# Patient Record
Sex: MALE | Race: WHITE | NOT HISPANIC OR LATINO | Employment: OTHER | URBAN - METROPOLITAN AREA
[De-identification: names, ages, dates, MRNs, and addresses within clinical notes are randomized per-mention and may not be internally consistent; named-entity substitution may affect disease eponyms.]

---

## 2017-06-28 ENCOUNTER — HOSPITAL ENCOUNTER (OUTPATIENT)
Dept: RADIOLOGY | Facility: MEDICAL CENTER | Age: 72
End: 2017-06-28

## 2017-06-28 ENCOUNTER — RESOLUTE PROFESSIONAL BILLING HOSPITAL PROF FEE (OUTPATIENT)
Dept: HOSPITALIST | Facility: MEDICAL CENTER | Age: 72
End: 2017-06-28
Payer: COMMERCIAL

## 2017-06-28 ENCOUNTER — HOSPITAL ENCOUNTER (INPATIENT)
Facility: MEDICAL CENTER | Age: 72
LOS: 2 days | DRG: 247 | End: 2017-06-30
Attending: EMERGENCY MEDICINE | Admitting: HOSPITALIST
Payer: COMMERCIAL

## 2017-06-28 DIAGNOSIS — R07.9 CHEST PAIN, UNSPECIFIED TYPE: ICD-10-CM

## 2017-06-28 DIAGNOSIS — I25.118 CORONARY ARTERY DISEASE OF NATIVE ARTERY OF NATIVE HEART WITH STABLE ANGINA PECTORIS (HCC): ICD-10-CM

## 2017-06-28 DIAGNOSIS — R79.89 ELEVATED TROPONIN: ICD-10-CM

## 2017-06-28 DIAGNOSIS — I21.4 NSTEMI (NON-ST ELEVATED MYOCARDIAL INFARCTION) (HCC): ICD-10-CM

## 2017-06-28 PROBLEM — I25.10 CAD (CORONARY ARTERY DISEASE): Status: ACTIVE | Noted: 2017-06-28

## 2017-06-28 LAB
ANION GAP SERPL CALC-SCNC: 9 MMOL/L (ref 0–11.9)
APTT PPP: 121.5 SEC (ref 24.7–36)
BUN SERPL-MCNC: 17 MG/DL (ref 8–22)
CALCIUM SERPL-MCNC: 9 MG/DL (ref 8.5–10.5)
CHLORIDE SERPL-SCNC: 109 MMOL/L (ref 96–112)
CO2 SERPL-SCNC: 21 MMOL/L (ref 20–33)
CREAT SERPL-MCNC: 1.15 MG/DL (ref 0.5–1.4)
EKG IMPRESSION: NORMAL
GFR SERPL CREATININE-BSD FRML MDRD: >60 ML/MIN/1.73 M 2
GLUCOSE BLD-MCNC: 198 MG/DL (ref 65–99)
GLUCOSE SERPL-MCNC: 71 MG/DL (ref 65–99)
MAGNESIUM SERPL-MCNC: 1.4 MG/DL (ref 1.5–2.5)
POTASSIUM SERPL-SCNC: 4 MMOL/L (ref 3.6–5.5)
SODIUM SERPL-SCNC: 139 MMOL/L (ref 135–145)
TROPONIN I SERPL-MCNC: 2.62 NG/ML (ref 0–0.04)
TROPONIN I SERPL-MCNC: 3.11 NG/ML (ref 0–0.04)

## 2017-06-28 PROCEDURE — 93005 ELECTROCARDIOGRAM TRACING: CPT

## 2017-06-28 PROCEDURE — 96375 TX/PRO/DX INJ NEW DRUG ADDON: CPT

## 2017-06-28 PROCEDURE — 83735 ASSAY OF MAGNESIUM: CPT

## 2017-06-28 PROCEDURE — 770020 HCHG ROOM/CARE - TELE (206)

## 2017-06-28 PROCEDURE — 85730 THROMBOPLASTIN TIME PARTIAL: CPT

## 2017-06-28 PROCEDURE — 96365 THER/PROPH/DIAG IV INF INIT: CPT

## 2017-06-28 PROCEDURE — 99223 1ST HOSP IP/OBS HIGH 75: CPT | Mod: AI | Performed by: HOSPITALIST

## 2017-06-28 PROCEDURE — 82962 GLUCOSE BLOOD TEST: CPT

## 2017-06-28 PROCEDURE — 700102 HCHG RX REV CODE 250 W/ 637 OVERRIDE(OP): Performed by: INTERNAL MEDICINE

## 2017-06-28 PROCEDURE — 99285 EMERGENCY DEPT VISIT HI MDM: CPT

## 2017-06-28 PROCEDURE — 36415 COLL VENOUS BLD VENIPUNCTURE: CPT

## 2017-06-28 PROCEDURE — 80048 BASIC METABOLIC PNL TOTAL CA: CPT

## 2017-06-28 PROCEDURE — A9270 NON-COVERED ITEM OR SERVICE: HCPCS | Performed by: INTERNAL MEDICINE

## 2017-06-28 PROCEDURE — 96366 THER/PROPH/DIAG IV INF ADDON: CPT

## 2017-06-28 PROCEDURE — 700111 HCHG RX REV CODE 636 W/ 250 OVERRIDE (IP): Performed by: PHARMACIST

## 2017-06-28 PROCEDURE — 93005 ELECTROCARDIOGRAM TRACING: CPT | Performed by: HOSPITALIST

## 2017-06-28 PROCEDURE — 304561 HCHG STAT O2

## 2017-06-28 PROCEDURE — 93010 ELECTROCARDIOGRAM REPORT: CPT | Performed by: INTERNAL MEDICINE

## 2017-06-28 PROCEDURE — 84484 ASSAY OF TROPONIN QUANT: CPT | Mod: 91

## 2017-06-28 RX ORDER — SODIUM CHLORIDE 9 MG/ML
INJECTION, SOLUTION INTRAVENOUS CONTINUOUS
Status: DISCONTINUED | OUTPATIENT
Start: 2017-06-28 | End: 2017-06-28

## 2017-06-28 RX ORDER — ZOLPIDEM TARTRATE 5 MG/1
5 TABLET ORAL
Status: DISCONTINUED | OUTPATIENT
Start: 2017-06-28 | End: 2017-06-30 | Stop reason: HOSPADM

## 2017-06-28 RX ORDER — ASPIRIN 81 MG/1
81 TABLET, CHEWABLE ORAL DAILY
COMMUNITY

## 2017-06-28 RX ORDER — SODIUM CHLORIDE 9 MG/ML
INJECTION, SOLUTION INTRAVENOUS CONTINUOUS
Status: DISCONTINUED | OUTPATIENT
Start: 2017-06-29 | End: 2017-06-29

## 2017-06-28 RX ORDER — HEPARIN SODIUM 1000 [USP'U]/ML
3200 INJECTION, SOLUTION INTRAVENOUS; SUBCUTANEOUS PRN
Status: DISCONTINUED | OUTPATIENT
Start: 2017-06-28 | End: 2017-06-29

## 2017-06-28 RX ORDER — BENAZEPRIL HYDROCHLORIDE 40 MG/1
40 TABLET ORAL DAILY
Status: ON HOLD | COMMUNITY
End: 2017-06-30

## 2017-06-28 RX ORDER — ASPIRIN 81 MG/1
81 TABLET, CHEWABLE ORAL
Status: DISCONTINUED | OUTPATIENT
Start: 2017-06-28 | End: 2017-06-29

## 2017-06-28 RX ORDER — PRASUGREL 10 MG/1
10 TABLET, FILM COATED ORAL DAILY
Status: DISCONTINUED | OUTPATIENT
Start: 2017-06-29 | End: 2017-06-29

## 2017-06-28 RX ORDER — GLIMEPIRIDE 2 MG/1
2 TABLET ORAL DAILY
Status: DISCONTINUED | OUTPATIENT
Start: 2017-06-29 | End: 2017-06-28

## 2017-06-28 RX ORDER — SIMVASTATIN 20 MG
20 TABLET ORAL DAILY
COMMUNITY
End: 2017-06-28

## 2017-06-28 RX ORDER — MORPHINE SULFATE 4 MG/ML
2-4 INJECTION, SOLUTION INTRAMUSCULAR; INTRAVENOUS
Status: DISCONTINUED | OUTPATIENT
Start: 2017-06-28 | End: 2017-06-30 | Stop reason: HOSPADM

## 2017-06-28 RX ORDER — MAGNESIUM SULFATE HEPTAHYDRATE 40 MG/ML
2 INJECTION, SOLUTION INTRAVENOUS ONCE
Status: COMPLETED | OUTPATIENT
Start: 2017-06-28 | End: 2017-06-29

## 2017-06-28 RX ORDER — SIMVASTATIN 40 MG
40 TABLET ORAL NIGHTLY
Status: DISCONTINUED | OUTPATIENT
Start: 2017-06-28 | End: 2017-06-30 | Stop reason: HOSPADM

## 2017-06-28 RX ORDER — HEPARIN SODIUM 1000 [USP'U]/ML
6000 INJECTION, SOLUTION INTRAVENOUS; SUBCUTANEOUS ONCE
Status: COMPLETED | OUTPATIENT
Start: 2017-06-28 | End: 2017-06-28

## 2017-06-28 RX ORDER — GLIMEPIRIDE 2 MG/1
2 TABLET ORAL DAILY
COMMUNITY

## 2017-06-28 RX ORDER — PRASUGREL 10 MG/1
10 TABLET, FILM COATED ORAL DAILY
COMMUNITY

## 2017-06-28 RX ORDER — ONDANSETRON 4 MG/1
4 TABLET, ORALLY DISINTEGRATING ORAL EVERY 4 HOURS PRN
Status: DISCONTINUED | OUTPATIENT
Start: 2017-06-28 | End: 2017-06-30 | Stop reason: HOSPADM

## 2017-06-28 RX ORDER — TRAZODONE HYDROCHLORIDE 50 MG/1
50 TABLET ORAL NIGHTLY
Status: DISCONTINUED | OUTPATIENT
Start: 2017-06-28 | End: 2017-06-30 | Stop reason: HOSPADM

## 2017-06-28 RX ORDER — DEXTROSE MONOHYDRATE 25 G/50ML
25 INJECTION, SOLUTION INTRAVENOUS
Status: DISCONTINUED | OUTPATIENT
Start: 2017-06-28 | End: 2017-06-30 | Stop reason: HOSPADM

## 2017-06-28 RX ORDER — ONDANSETRON 2 MG/ML
4 INJECTION INTRAMUSCULAR; INTRAVENOUS EVERY 4 HOURS PRN
Status: DISCONTINUED | OUTPATIENT
Start: 2017-06-28 | End: 2017-06-30 | Stop reason: HOSPADM

## 2017-06-28 RX ORDER — NITROGLYCERIN 0.4 MG/1
0.4 TABLET SUBLINGUAL
Status: DISCONTINUED | OUTPATIENT
Start: 2017-06-28 | End: 2017-06-30 | Stop reason: HOSPADM

## 2017-06-28 RX ORDER — SIMVASTATIN 40 MG
40 TABLET ORAL NIGHTLY
COMMUNITY

## 2017-06-28 RX ORDER — TRAZODONE HYDROCHLORIDE 50 MG/1
50 TABLET ORAL NIGHTLY
COMMUNITY

## 2017-06-28 RX ORDER — BENAZEPRIL HYDROCHLORIDE 20 MG/1
20 TABLET ORAL DAILY
Status: DISCONTINUED | OUTPATIENT
Start: 2017-06-29 | End: 2017-06-30 | Stop reason: HOSPADM

## 2017-06-28 RX ADMIN — METOPROLOL TARTRATE 25 MG: 25 TABLET, FILM COATED ORAL at 22:05

## 2017-06-28 RX ADMIN — NITROGLYCERIN 0.5 INCH: 20 OINTMENT TOPICAL at 17:44

## 2017-06-28 RX ADMIN — ASPIRIN 81 MG: 81 TABLET, CHEWABLE ORAL at 22:05

## 2017-06-28 RX ADMIN — HEPARIN SODIUM 6000 UNITS: 1000 INJECTION, SOLUTION INTRAVENOUS; SUBCUTANEOUS at 17:47

## 2017-06-28 RX ADMIN — TRAZODONE HYDROCHLORIDE 50 MG: 50 TABLET ORAL at 22:05

## 2017-06-28 RX ADMIN — HEPARIN SODIUM 1200 UNITS: 5000 INJECTION, SOLUTION INTRAVENOUS at 17:47

## 2017-06-28 RX ADMIN — SIMVASTATIN 40 MG: 40 TABLET, FILM COATED ORAL at 22:05

## 2017-06-28 ASSESSMENT — ENCOUNTER SYMPTOMS
NAUSEA: 0
VOMITING: 0
BRUISES/BLEEDS EASILY: 0
INSOMNIA: 1
NEUROLOGICAL NEGATIVE: 1
SHORTNESS OF BREATH: 0
WEAKNESS: 0
PALPITATIONS: 0
CONSTITUTIONAL NEGATIVE: 1
DEPRESSION: 0

## 2017-06-28 ASSESSMENT — LIFESTYLE VARIABLES
HAVE YOU EVER FELT YOU SHOULD CUT DOWN ON YOUR DRINKING: NO
EVER FELT BAD OR GUILTY ABOUT YOUR DRINKING: NO
TOTAL SCORE: 0
AVERAGE NUMBER OF DAYS PER WEEK YOU HAVE A DRINK CONTAINING ALCOHOL: 2
HAVE PEOPLE ANNOYED YOU BY CRITICIZING YOUR DRINKING: NO
DO YOU DRINK ALCOHOL: YES
ON A TYPICAL DAY WHEN YOU DRINK ALCOHOL HOW MANY DRINKS DO YOU HAVE: 2
TOTAL SCORE: 0
CONSUMPTION TOTAL: NEGATIVE
EVER HAD A DRINK FIRST THING IN THE MORNING TO STEADY YOUR NERVES TO GET RID OF A HANGOVER: NO
HOW MANY TIMES IN THE PAST YEAR HAVE YOU HAD 5 OR MORE DRINKS IN A DAY: 0
TOTAL SCORE: 0

## 2017-06-28 ASSESSMENT — COGNITIVE AND FUNCTIONAL STATUS - GENERAL
DAILY ACTIVITIY SCORE: 24
SUGGESTED CMS G CODE MODIFIER DAILY ACTIVITY: CH
SUGGESTED CMS G CODE MODIFIER MOBILITY: CH
MOBILITY SCORE: 24

## 2017-06-28 NOTE — IP AVS SNAPSHOT
" <p align=\"LEFT\"><IMG SRC=\"//[email or web address]\" alt=\"Image\" WIDTH=\"50%\" HEIGHT=\"200\" BORDER=\"\"></p>                   Name:Parvez Scott  Medical Record Number:[id number]  CSN: [id number]    YOB: 1945   Age: 71 y.o.  Sex: male  HT:1.778 m (5' 10\") WT: 93 kg (205 lb 0.4 oz)          Admit Date: 6/28/2017     Discharge Date:   Today's Date: 6/30/2017  Attending Doctor:  Carlos A Mijares D.O.                  Allergies:  Review of patient's allergies indicates no known allergies.          Follow-up Information     1. Follow up with PCP . Schedule an appointment as soon as possible for a visit in 1 week.         Medication List      Take these Medications        Instructions    aspirin 81 MG Chew chewable tablet   Commonly known as:  ASA    Take 81 mg by mouth every day.   Dose:  81 mg       benazepril 20 MG Tabs   What changed:    - medication strength  - how much to take   Commonly known as:  LOTENSIN    Take 1 Tab by mouth every day.   Dose:  20 mg       carvedilol 3.125 MG Tabs   Commonly known as:  COREG    Take 1 Tab by mouth 2 times a day, with meals.   Dose:  3.125 mg       glimepiride 2 MG Tabs   Commonly known as:  AMARYL    Take 2 mg by mouth every day.   Dose:  2 mg       metformin 500 MG Tabs   Commonly known as:  GLUCOPHAGE    Take 500 mg by mouth 2 times a day, with meals.   Dose:  500 mg       prasugrel 10 MG Tabs   Commonly known as:  EFFIENT    Take 10 mg by mouth every day.   Dose:  10 mg       simvastatin 40 MG Tabs   Commonly known as:  ZOCOR    Take 40 mg by mouth every evening.   Dose:  40 mg       trazodone 50 MG Tabs   Commonly known as:  DESYREL    Take 50 mg by mouth every evening.   Dose:  50 mg         "

## 2017-06-28 NOTE — ED NOTES
Jose from Lab called with critical result of troponin 3.11 at 1655. Critical lab result read back to Jose.   Dr. Degroot notified of critical lab result at 1656.  Critical lab result read back by Dr. Degroot.

## 2017-06-28 NOTE — IP AVS SNAPSHOT
6/30/2017    Parvez Scott  [address]  [facility] [address]    Dear Parvez:    [facility] wants to ensure your discharge home is safe and you or your loved ones have had all of your questions answered regarding your care after you leave the hospital.    Below is a list of resources and contact information should you have any questions regarding your hospital stay, follow-up instructions, or active medical symptoms.    Questions or Concerns Regarding… Contact   Medical Questions Related to Your Discharge  (7 days a week, 8am-5pm) Contact a Nurse Care Coordinator   [phone]   Medical Questions Not Related to Your Discharge  (24 hours a day / 7 days a week)  Contact the Nurse Health Line   [phone]    Medications or Discharge Instructions Refer to your discharge packet   or contact your [facility] Primary Care Provider   [phone]   Follow-up Appointment(s) Schedule your appointment via [facility]Owl   or contact Scheduling [phone]   Billing Review your statement via NightOwl  or contact Billing [phone]   Medical Records Review your records via NightOwl   or contact Medical Records [phone]     You may receive a telephone call within two days of discharge. This call is to make certain you understand your discharge instructions and have the opportunity to have any questions answered. You can also easily access your medical information, test results and upcoming appointments via the [facility]Owl free online health management tool. You can learn more and sign up at [employer]/NightOwl. For assistance setting up your NightOwl account, please call [phone].    Once again, we want to ensure your discharge home is safe and that you have a clear understanding of any next steps in your care. If you have any questions or concerns, please do not hesitate to contact us, we are here for you. Thank you for choosing [facility] for your healthcare needs.    Sincerely,    Your [facility] Healthcare Team

## 2017-06-28 NOTE — ED PROVIDER NOTES
"ED Provider Note    CHIEF COMPLAINT  Chief Complaint   Patient presents with   • Chest Pain     Transfer from [place], chest pain at 0830, resolved after 20 minutes       HPI  Parvez Scott is a 71 y.o. female who presents via transfer from [place] for elevated troponin in the setting of chest pain.    Patient states he was looking up the [place] trailer when he had acute onset of sharp substernal chest pain that became more severe and achy or as time went on. This was nonradiating. The pain lasted approximately 30-45 minutes. It resolved with rest and \"calming down.\" He believes perhaps to aspirin he took also helped him. He admits to associated shortness of breath but denies nausea, vomiting, diaphoresis.  He denies trauma, calf pain, leg swelling. Patient has had similar symptoms when he had an MI and was found to have a 97% occlusion of his LAD 2 years ago in [facility].    Patient denies chest pain at this time.      ALLERGIES  No Known Allergies    CURRENT MEDICATIONS  Home Medications     Reviewed by Feng Mckeon (Pharmacy Tech) on 06/28/17 at 1647  Med List Status: Complete    Medication Last Dose Status    aspirin (ASA) 81 MG Chew Tab chewable tablet 6/27/2017 Active    benazepril (LOTENSIN) 40 MG tablet 6/28/2017 Active    glimepiride (AMARYL) 2 MG Tab 6/28/2017 Active    metformin (GLUCOPHAGE) 500 MG Tab 6/28/2017 Active    prasugrel (EFFIENT) 10 MG Tab 6/27/2017 Active    simvastatin (ZOCOR) 40 MG Tab 6/27/2017 Active    trazodone (DESYREL) 50 MG Tab 6/27/2017 Active                PAST MEDICAL HISTORY     Coronary artery disease, diabetes, dyslipidemia    SURGICAL HISTORY  patient denies any surgical history    SOCIAL HISTORY  Lives in [facility]        REVIEW OF SYSTEMS  See HPI for further details.  All other systems are negative except as above in HPI.      PHYSICAL EXAM  VITAL SIGNS: /65 mmHg  Pulse 79  Resp 16  Ht 1.778 m (5' 10\")  Wt 97.52 kg (214 " lb 15.9 oz)  BMI 30.85 kg/m2  SpO2 97%    General:  WD overweight, nontoxic appearing in NAD; A+Ox3; V/S as above   Skin: warm and dry; good color; no rash  HEENT: NCAT; EOMs intact; PERRL; no scleral icterus   Neck: FROM; no meningismus, no LAD  Cardiovascular: Regular heart rate and rhythm.  No murmurs, rubs, or gallops; pulses 2+ bilaterally radially   Chest wall: Nontender, no crepitus  Lungs: Clear to auscultation with good air movement bilaterally.  No wheezes, rhonchi, or rales.   Abdomen: BS present; soft; NTND; no rebound, guarding, or rigidity.  No organomegaly or pulsatile mass; no CVAT   Extremities: CALLE x 4; no e/o trauma; no pedal edema; negative Homans  Neurologic: CNs III-XII grossly intact; speech clear; distal sensation intact; strength 5/5 UE/LEs  Psychiatric: Appropriate affect, normal mood    LABS  Results for orders placed or performed during the hospital encounter of 17   TROPONIN   Result Value Ref Range    Troponin I 3.11 (H) 0.00 - 0.04 ng/mL   EKG (ER)   Result Value Ref Range    Report       [facility] Emergency Dept.    Test Date:  2017  Pt Name:    CHANO PARISI              Department: ER  MRN:        [id number]                      Room:        13  Gender:     F                            Technician: [id number]  :        1945                   Requested By:ANAI WILLOUGHBY  Order #:    [id number]                    Reading MD: ANAI WILLOUGHBY MD    Measurements  Intervals                                Axis  Rate:       65                           P:          1  WY:         172                          QRS:        -48  QRSD:       90                           T:          -5  QT:         408  QTc:        425    Interpretive Statements  SINUS RHYTHM  LEFT ANTERIOR FASCICULAR BLOCK  LATE PRECORDIAL R/S TRANSITION  CONSIDER LEFT VENTRICULAR HYPERTROPHY  BORDERLINE T ABNORMALITIES, INFERIOR LEADS  No previous ECG available for  comparison    Electronically Signed On 6- 16:19:29 PDT by ANAI WILLOUGHBY MD           EKG  12 Lead EKG obtained at 1615 and interpreted by me to show:  Rhythm: Sinus rhythm   Rate: 65  Intervals:   MD: 172   QRS: 90   QTC: 425   All intervals are within normal limits  No ectopy  Leftward axis  No ST changes  Clinical Impression: sinus rhythm with no acute ST changes  Compared to previous EKG dated earlier today which shows no change  EKG has been confirmed in [facility]    IMAGING  OUTSIDE IMAGES-DX CHEST   Preliminary Result            MEDICAL RECORD  I have reviewed patient's medical record and pertinent results are listed below.    Patient's outside records are reviewed. Labs reveal a glucose of 127, BUN 19, creatinine 1.3, troponin 0.436, and myoglobin of 182. Chest x-ray demonstrates no acute pulmonary or cardiac process.      COURSE & MEDICAL DECISION MAKING  I have reviewed any medical record information, laboratory studies and radiographic results as noted.    Parvez Scott is a 71 y.o. male with known coronary artery disease status post stent placement of LAD 2.5 years ago who presents as a transfer from [place] after he presented there for chest pain and found to have an elevated troponin. EKG revealed no ST elevations. Patient received aspirin prior to arrival. No chest pain at this time. I do not suspect thoracic aortic dissection or PE.    Initial EKG here demonstrates nonspecific T wave abnormalities inferiorly.    4:31 PM  Paging cardiology  I discussed the case with Dr. Campos from the hospitalist service who agrees to admit the patient is aware of the pending cardiology consult.      Lab called with a troponin of 3.11.    5:02 PM  I discussed the case with Dr. Dugan who will come see the patient now    The total critical care time on this patient is 40 minutes, resuscitating patient, speaking with admitting physician, and deciphering test results. This 40 minutes is exclusive of  separately billable procedures.      FINAL IMPRESSION  1. Chest pain, unspecified type    2. Elevated troponin      Electronically signed by: Shanita Degroot, 6/28/2017 4:12 PM

## 2017-06-28 NOTE — IP AVS SNAPSHOT
[facility] Access Code: [email or web address]  Expires: 7/30/2017 11:55 AM    Your email address is not on file at [facility].  Email Addresses are required for you to sign up for [facility], please contact [phone] to verify your personal information and to provide your email address prior to attempting to register for [facility].    Parvez Scott  [address] [place] [address]    [facility]  A secure, online tool to manage your health information     [facility]’s [facility]® is a secure, online tool that connects you to your personalized health information from the privacy of your home -- day or night - making it very easy for you to manage your healthcare. Once the activation process is completed, you can even access your medical information using the [facility] eduin, which is available for free in the Apple Eduin store or Google Play store.     To learn more about [facility], visit [email or web address]    There are two levels of access available (as shown below):   My Chart Features  [facility] Primary Care Doctor [facility]  Specialists [facility]  Urgent  Care Non-[facility] Primary Care Doctor   Email your healthcare team securely and privately 24/7 X X X    Manage appointments: schedule your next appointment; view details of past/upcoming appointments X      Request prescription refills. X      View recent personal medical records, including lab and immunizations X X X X   View health record, including health history, allergies, medications X X X X   Read reports about your outpatient visits, procedures, consult and ER notes X X X X   See your discharge summary, which is a recap of your hospital and/or ER visit that includes your diagnosis, lab results, and care plan X X  X     How to register for [facility]:  Once your e-mail address has been verified, follow the following steps to sign up for [facility].     1. Go to  [email or web address]  2. Click on the Sign Up Now box, which takes you to the New Member Sign Up page. You will  need to provide the following information:  a. Enter your [facility] Access Code exactly as it appears at the top of this page. (You will not need to use this code after you’ve completed the sign-up process. If you do not sign up before the expiration date, you must request a new code.)   b. Enter your date of birth.   c. Enter your home email address.   d. Click Submit, and follow the next screen’s instructions.  3. Create a [employer] ID. This will be your [employer] login ID and cannot be changed, so think of one that is secure and easy to remember.  4. Create a [employer] password. You can change your password at any time.  5. Enter your Password Reset Question and Answer. This can be used at a later time if you forget your password.   6. Enter your e-mail address. This allows you to receive e-mail notifications when new information is available in [employer].  7. Click Sign Up. You can now view your health information.    For assistance activating your [employer] account, call [phone]

## 2017-06-28 NOTE — ED NOTES
Pt transferred from [place].  Pt developed chest pain after hooking up a trailer, sharp in nature.  Pt states the pain lasted about 20 minutes and subsided.  Pt is currently asymptomatic.

## 2017-06-28 NOTE — IP AVS SNAPSHOT
" Home Care Instructions                                                                                                                  Name:Parvez Scott  Medical Record Number:[id number]  CSN: [id number]    YOB: 1945   Age: 71 y.o.  Sex: male  HT:1.778 m (5' 10\") WT: 93 kg (205 lb 0.4 oz)          Admit Date: 6/28/2017     Discharge Date:   Today's Date: 6/30/2017  Attending Doctor:  Carlos A Mijares D.O.                  Allergies:  Review of patient's allergies indicates no known allergies.            Discharge Instructions       Discharge Instructions    Discharged to home by car with relative. Discharged via walking, hospital escort: Refused.  Special equipment needed: Not Applicable    Be sure to schedule a follow-up appointment with your primary care doctor or any specialists as instructed.     Discharge Plan:   Diet Plan: Discussed  Activity Level: Discussed  Confirmed Follow up Appointment: Patient to Call and Schedule Appointment  Confirmed Symptoms Management: Discussed  Medication Reconciliation Updated: Yes  Influenza Vaccine Indication: Patient Refuses    I understand that a diet low in cholesterol, fat, and sodium is recommended for good health. Unless I have been given specific instructions below for another diet, I accept this instruction as my diet prescription.   Other diet: Cardiac, Diabetic     Special Instructions: Diagnosis:  Acute Coronary Syndrome (ACS) is a diagnosis that encompasses cardiac-related chest pain and heart attack. ACS occurs when the blood flow to the heart muscle is severely reduced or cut off completely due to a slow process called atherosclerosis.  Atherosclerosis is a disease in which the coronary arteries become narrow from a buildup of fat, cholesterol, and other substances that combine to form plaque. If the plaque breaks, a blood clot will form and block the blood flow to the heart muscle. This lack of blood flow can cause damage or death to the heart " muscle which is called a heart attack or Myocardial Infarction (MI). There are two different types of MIs:  ST Elevation Myocardial Infarction or STEMI (the most severe type of heart attack) and Non-ST Elevation Myocardial Infarction or NSTEMI.    Treatment Plan:  · Cardiac Diet  - Low fat, low salt, low cholesterol   · Cardiac Rehab  - Your doctor has ordered you a referral to [facility].  Call [phone] to schedule an appointment.  · Attend my follow-up appointment with my Cardiologist.  · Take my medications as prescribed by my doctor  · Exercise daily  · Lower my bad cholesterol and raise my good cholesterol, lower my blood pressure, Reduce stress and Control my diabetes    Medications:  Certain medications are used to treat ACS.  Remember to always take medications as prescribed and never stop talking medications unless told by your doctor.    You have been prescribed the following medicatons:    Aspirin - Aspirin is used as a blood thinning medication and you will require this medication indefinitely.  Anti-platelet/blood thinner - Your Anti-platelet/Blood thinning medication is called Effient, and is used in combination with aspirin to prevent clots from forming in your heart and/or around your stent.  Your doctor will determine how long you need to be on this medicine.  Beta-Blocker - Beta-Blocker carvedilol is used to lower blood pressure and heart rate, and/or helps your heart heal after a heart attack.  Statin - Statin simvastatin is used to lower cholesterol.    · Is patient discharged on Warfarin / Coumadin?   No     · Is patient Post Blood Transfusion?  No    Depression / Suicide Risk    As you are discharged from this [facility] facility, it is important to learn how to keep safe from harming yourself.    Recognize the warning signs:  · Abrupt changes in personality, positive or negative- including increase in energy   · Giving away possessions  · Change in eating patterns- significant weight  changes-  positive or negative  · Change in sleeping patterns- unable to sleep or sleeping all the time   · Unwillingness or inability to communicate  · Depression  · Unusual sadness, discouragement and loneliness  · Talk of wanting to die  · Neglect of personal appearance   · Rebelliousness- reckless behavior  · Withdrawal from people/activities they love  · Confusion- inability to concentrate     If you or a loved one observes any of these behaviors or has concerns about self-harm, here's what you can do:  · Talk about it- your feelings and reasons for harming yourself  · Remove any means that you might use to hurt yourself (examples: pills, rope, extension cords, firearm)  · Get professional help from the community (Mental Health, Substance Abuse, psychological counseling)  · Do not be alone:Call your Safe Contact- someone whom you trust who will be there for you.  · Call your local CRISIS HOTLINE 351-4058 or 700-184-5121  · Call your local Children's Mobile Crisis Response Team [place] (182) 810-8656 or wwwRadioFrame  · Call the toll free National Suicide Prevention Hotlines   · National Suicide Prevention Lifeline 032-885-QRAV (3759)  · National Hope Line Network 800-SUICIDE (922-7105)    Coronary Artery Disease, Male  Coronary artery disease (CAD) is a process in which the blood vessels of the heart (coronary arteries) become narrow or blocked. The narrowing or blockage can lead to decreased blood flow to the heart muscle (angina). Prolonged reduced blood flow can cause a heart attack (myocardial infarction, MI). Because CAD is the leading cause of death in men, it is important to understand what causes this condition and how it is treated.  CAUSES  Atherosclerosis is the cause of CAD. This is the buildup of fat and cholesterol (plaque) on the inside of the arteries. Over time, the plaque may narrow or block the artery, and this will lessen blood flow to the heart. Plaque can also become weak and  break off within a coronary artery to form a clot and cause a sudden blockage.  RISK FACTORS  Many risk factors increase your chances of getting CAD, including:  · High cholesterol levels.  · High blood pressure (hypertension).  · Tobacco use.  · Diabetes.  · Age. Men over age 45 are at a greater risk of CAD.  · Gender. Men often develop CAD earlier in life than women.  · Family history of CAD.  · Obesity.  · Lack of exercise.  · A diet high in saturated fats.  SYMPTOMS   Many people do not experience any symptoms during the early stages of CAD. As the condition progresses, symptoms may include:   · Chest pain.  ¨ The pain can be described as a crushing or squeezing in the chest, or a tightness, pressure, fullness, or heaviness in the chest.  ¨ The pain can last more than a few minutes or can stop and recur.   · Pain in the arms, neck, jaw, or back.  · Unexplained heartburn or indigestion.  · Shortness of breath.  · Nausea.  · Sudden cold sweats.   Less common symptoms of CAD in men can include:  · Fatigue.  · Unexplained feelings of nervousness or anxiety.  · Weakness.  · Diarrhea.  · Sudden light-headedness.  DIAGNOSIS   Tests to diagnose CAD may include:  · ECG (electrocardiogram).  · Exercise stress test. This looks for signs of blockage when the heart is being exercised.  · Pharmacologic stress test. This test looks for signs of blockage when the heart is being stressed with a medicine.  · Blood tests.  · Coronary angiogram. This is a procedure to look at the coronary arteries to see if there is any blockage.  TREATMENT  The treatment of CAD may include the following:  · Healthy behavioral changes to reduce or control risk factors.  · Medicine.  · Coronary stenting. A stent helps to keep an artery open.  · Coronary angioplasty. This procedure widens a narrowed or blocked artery.  · Coronary artery bypass surgery. This will allow your blood to pass the blockage (bypass) to reach your heart.  HOME CARE  INSTRUCTIONS  · Take medicines only as directed by your health care provider.  · Do not take the following medicines unless your health care provider approves:  ¨ Nonsteroidal anti-inflammatory drugs (NSAIDs), such as ibuprofen, naproxen, or celecoxib.  ¨ Vitamin supplements that contain vitamin A, vitamin E, or both.  · Manage other health conditions such as hypertension and diabetes as directed by your health care provider.  · Follow a heart-healthy diet. A dietitian can help to educate you about healthy food options and changes.  · Use healthy cooking methods such as roasting, grilling, broiling, baking, poaching, steaming, or stir-frying. Talk to a dietitian to learn more about healthy cooking methods.  · Follow an exercise program approved by your health care provider.  · Maintain a healthy weight. Lose weight as approved by your health care provider.  · Plan rest periods when fatigued.  · Learn to manage stress.  · Do not use any tobacco products, including cigarettes, chewing tobacco, or electronic cigarettes. If you need help quitting, ask your health care provider.  · If you drink alcohol, and your health care provider approves, limit your alcohol intake to no more than 1 drink per day. One drink equals 12 ounces of beer, 5 ounces of wine, or 1½ ounces of hard liquor.  · Stop illegal drug use.  · Your health care provider may ask you to monitor your blood pressure. A blood pressure reading consists of a higher number over a lower number, such as 110 over 72, which is written as 110/72. Ideally, your blood pressure should be:  ¨ Below 140/90 if you have no other medical conditions.  ¨ Below 130/80 if you have diabetes or kidney disease.  · Keep all follow-up visits as directed by your health care provider. This is important.  SEEK IMMEDIATE MEDICAL CARE IF:  · You have pain in your chest, neck, arm, jaw, stomach, or back that lasts more than a few minutes, is recurring, or is unrelieved by taking medicine  under your tongue (sublingual nitroglycerin).  · You have profuse sweating without cause.  · You have unexplained:  ¨ Heartburn or indigestion.  ¨ Shortness of breath or difficulty breathing.  ¨ Nausea or vomiting.  ¨ Fatigue.  ¨ Feelings of nervousness or anxiety.  ¨ Weakness.  ¨ Diarrhea.  · You have sudden light-headedness or dizziness.  · You faint.  These symptoms may represent a serious problem that is an emergency. Do not wait to see if the symptoms will go away. Get medical help right away. Call your local emergency services (911 in the [place] Do not drive yourself to the hospital.     This information is not intended to replace advice given to you by your health care provider. Make sure you discuss any questions you have with your health care provider.     Document Released: 07/15/2015 Document Reviewed: 07/15/2015  [employer] Interactive Patient Education ©2016 [employer]      Carvedilol tablets  What is this medicine?  CARVEDILOL (ROSSY ve dil ol) is a beta-blocker. Beta-blockers reduce the workload on the heart and help it to beat more regularly. This medicine is used to treat high blood pressure and heart failure.  This medicine may be used for other purposes; ask your health care provider or pharmacist if you have questions.  COMMON BRAND NAME(S): Coreg  What should I tell my health care provider before I take this medicine?  They need to know if you have any of these conditions:  -circulation problems  -diabetes  -history of heart attack or heart disease  -liver disease  -lung or breathing disease, like asthma or emphysema  -pheochromocytoma  -slow or irregular heartbeat  -thyroid disease  -an unusual or allergic reaction to carvedilol, other beta-blockers, medicines, foods, dyes, or preservatives  -pregnant or trying to get pregnant  -breast-feeding  How should I use this medicine?  Take this medicine by mouth with a glass of water. Follow the directions on the prescription label. It is best to take  the tablets with food. Take your doses at regular intervals. Do not take your medicine more often than directed. Do not stop taking except on the advice of your doctor or health care professional.  Talk to your pediatrician regarding the use of this medicine in children. Special care may be needed.  Overdosage: If you think you have taken too much of this medicine contact a poison control center or emergency room at once.  NOTE: This medicine is only for you. Do not share this medicine with others.  What if I miss a dose?  If you miss a dose, take it as soon as you can. If it is almost time for your next dose, take only that dose. Do not take double or extra doses.  What may interact with this medicine?  Do not take this medicine with any of the following medications:  -sotalol  This medicine may also interact with the following medications:  -cimetidine  -clonidine  -cyclosporine  -digoxin  -MAOIs like Carbex, Eldepryl, Marplan, Nardil, and Parnate  -medicines for blood pressure, heart disease, irregular heart beat  -medicines for depression like fluoxetine and paroxetine  -medicines for diabetes  -medicines to control heart rhythm like propafenone and quinidine  -reserpine  -rifampin  This list may not describe all possible interactions. Give your health care provider a list of all the medicines, herbs, non-prescription drugs, or dietary supplements you use. Also tell them if you smoke, drink alcohol, or use illegal drugs. Some items may interact with your medicine.  What should I watch for while using this medicine?  Check your heart rate and blood pressure regularly while you are taking this medicine. Ask your doctor or health care professional what your heart rate and blood pressure should be, and when you should contact him or her. Do not stop taking this medicine suddenly. This could lead to serious heart-related effects.  Contact your doctor or health care professional if you have difficulty breathing while  taking this drug.  Check your weight daily. Ask your doctor or health care professional when you should notify him/her of any weight gain.  You may get drowsy or dizzy. Do not drive, use machinery, or do anything that requires mental alertness until you know how this medicine affects you. To reduce the risk of dizzy or fainting spells, do not sit or stand up quickly. Alcohol can make you more drowsy, and increase flushing and rapid heartbeats. Avoid alcoholic drinks.  If you have diabetes, check your blood sugar as directed. Tell your doctor if you have changes in your blood sugar while you are taking this medicine.  If you are going to have surgery, tell your doctor or health care professional that you are taking this medicine.  What side effects may I notice from receiving this medicine?  Side effects that you should report to your doctor or health care professional as soon as possible:  -allergic reactions like skin rash, itching or hives, swelling of the face, lips, or tongue  -breathing problems  -dark urine  -irregular heartbeat  -swollen legs or ankles  -vomiting  -yellowing of the eyes or skin  Side effects that usually do not require medical attention (report to your doctor or health care professional if they continue or are bothersome):  -change in sex drive or performance  -diarrhea  -dry eyes (especially if wearing contact lenses)  -dry, itching skin  -headache  -nausea  -unusually tired  This list may not describe all possible side effects. Call your doctor for medical advice about side effects. You may report side effects to FDA at 3-789-FDA-5661.  Where should I keep my medicine?  Keep out of the reach of children.  Store at room temperature below 30 degrees C (86 degrees F). Protect from moisture. Keep container tightly closed. Throw away any unused medicine after the expiration date.  NOTE: This sheet is a summary. It may not cover all possible information. If you have questions about this medicine,  talk to your doctor, pharmacist, or health care provider.  © 2014, [employer]. (3/12/2009 2:39:22 PM)            Follow-up Information     1. Follow up with PCP . Schedule an appointment as soon as possible for a visit in 1 week.         Discharge Medication Instructions:    Below are the medications your physician expects you to take upon discharge:    Review all your home medications and newly ordered medications with your doctor and/or pharmacist. Follow medication instructions as directed by your doctor and/or pharmacist.    Please keep your medication list with you and share with your physician.               Medication List      START taking these medications        Instructions    Morning Afternoon Evening Bedtime    carvedilol 3.125 MG Tabs   Last time this was given:  3.125 mg on 6/30/2017 11:25 AM   Commonly known as:  COREG   Next Dose Due:  Tonight           Take 1 Tab by mouth 2 times a day, with meals.   Dose:  3.125 mg                          CHANGE how you take these medications        Instructions    Morning Afternoon Evening Bedtime    benazepril 20 MG Tabs   What changed:    - medication strength  - how much to take   Last time this was given:  20 mg on 6/30/2017  8:25 AM   Commonly known as:  LOTENSIN   Next Dose Due:  Tomorrow          Take 1 Tab by mouth every day.   Dose:  20 mg                          CONTINUE taking these medications        Instructions    Morning Afternoon Evening Bedtime    aspirin 81 MG Chew chewable tablet   Last time this was given:  81 mg on 6/28/2017 10:05 PM   Commonly known as:  ASA   Next Dose Due:  Tonight           Take 81 mg by mouth every day.   Dose:  81 mg                        glimepiride 2 MG Tabs   Commonly known as:  AMARYL   Next Dose Due:  Continue home regimen        Take 2 mg by mouth every day.   Dose:  2 mg                        metformin 500 MG Tabs   Commonly known as:  GLUCOPHAGE   Next Dose Due:  Continue home regimen           Take 500 mg by mouth 2 times a day, with meals.   Dose:  500 mg                        prasugrel 10 MG Tabs   Last time this was given:  10 mg on 6/30/2017  8:26 AM   Commonly known as:  EFFIENT   Next Dose Due:  Tomorrow         Take 10 mg by mouth every day.   Dose:  10 mg                        simvastatin 40 MG Tabs   Last time this was given:  40 mg on 6/29/2017  8:47 PM   Commonly known as:  ZOCOR   Next Dose Due:  Tonight         Take 40 mg by mouth every evening.   Dose:  40 mg                        trazodone 50 MG Tabs   Last time this was given:  50 mg on 6/29/2017  8:47 PM   Commonly known as:  DESYREL   Next Dose Due:  Tonight         Take 50 mg by mouth every evening.   Dose:  50 mg                             Where to Get Your Medications      Information about where to get these medications is not yet available     ! Ask your nurse or doctor about these medications    - benazepril 20 MG Tabs  - carvedilol 3.125 MG Tabs            Orders for after discharge     REFERRAL TO INTENSIVE CARDIAC REHAB/CARDIAC REHAB    Complete by:  As directed    Qualifying Diagnosis for Cardiac Rehab:    -Myocardial Infarction  -Old Myocardial Infarction  -Coronary Artery Bypass Surgery  -Stable Angina Pectoris  -PTCA Status with or without Stents  -Heart Valve Replaced by other means  -Heart Transplant   -Aneurysm Repair    Provider Exercise Prescription for Treatment Plan:  -Outpatient Cardiac Rehab (CR)/Intensive Cardiac Rehab (ICR), duration based on patient progress 1-3 times per week up to a total of 36/72 sessions over a period of up to 18/36 weeks    -Progressive interval exercise training for 20-45 minutes, 1-3 times per week in Cardiac Rehab utilizing Treadmill, Elliptical, Stationary Cycling, Arm Ergometer, other conditioning activities and appropriate home program supplement    -Light resistance training 2-4 weeks post PTCA, 2-4 weeks post MI, or 4-6 weeks post CABG, 4-6 weeks post aneurysm repair (20 #  max)    -% TARGET HEART RATE OR MET’s:        RPE of 11-16 on a scale of 6-20       GXT Data:  40% - 80% exercise capacity using %HR max       HR below ischemic threshold (if determined, HR restriction)    -Education to promote an active healthy lifestyle and reduction of personal health risk factors    -Follow [facility] Policies and Procedures for Phase II CR/ICR             Instructions           Diet / Nutrition:    Follow any diet instructions given to you by your doctor or the dietician, including how much salt (sodium) you are allowed each day.    If you are overweight, talk to your doctor about a weight reduction plan.    Activity:    Remain physically active following your doctor's instructions about exercise and activity.    Rest often.     Any time you become even a little tired or short of breath, SIT DOWN and rest.    Worsening Symptoms:    Report any of the following signs and symptoms to the doctor's office immediately:    *Pain of jaw, arm, or neck  *Chest pain not relieved by medication                               *Dizziness or loss of consciousness  *Difficulty breathing even when at rest   *More tired than usual                                       *Bleeding drainage or swelling of surgical site  *Swelling of feet, ankles, legs or stomach                 *Fever (>100ºF)  *Pink or blood tinged sputum  *Weight gain (3lbs/day or 5lbs /week)           *Shock from internal defibrillator (if applicable)  *Palpitations or irregular heartbeats                *Cool and/or numb extremities    Stroke Awareness    Common Risk Factors for Stroke include:    Age  Atrial Fibrillation  Carotid Artery Stenosis  Diabetes Mellitus  Excessive alcohol consumption  High blood pressure  Overweight   Physical inactivity  Smoking    Warning signs and symptoms of a stroke include:    *Sudden numbness or weakness of the face, arm or leg (especially on one side of the body).  *Sudden confusion, trouble speaking or  understanding.  *Sudden trouble seeing in one or both eyes.  *Sudden trouble walking, dizziness, loss of balance or coordination.Sudden severe headache with no known cause.    It is very important to get treatment quickly when a stroke occurs. If you experience any of the above warning signs, call 911 immediately.                   Disclaimer         Quit Smoking / Tobacco Use:    I understand the use of any tobacco products increases my chance of suffering from future heart disease or stroke and could cause other illnesses which may shorten my life. Quitting the use of tobacco products is the single most important thing I can do to improve my health. For further information on smoking / tobacco cessation call a Toll Free Quit Line at 1-107.765.2340 (*[employer] [place]) or 1-868.477.2515 (American Lung Association) or you can access the web based program at www.lungusa.org.    [place] Tobacco Users Help Line:  (848) 788-9433       Toll Free: 1-418.383.4911  Quit Tobacco Program [facility] (433)796-4466    Crisis Hotline:    [place] Crisis Hotline:  0-033-RSOKMMG or 1-276.289.4500    [place] Crisis Hotline:    1-451.207.6643 or 771-120-1478    Discharge Survey:   Thank you for choosing [facility]. We hope we did everything we could to make your hospital stay a pleasant one. You may be receiving a phone survey and we would appreciate your time and participation in answering the questions. Your input is very valuable to us in our efforts to improve our service to our patients and their families.        My signature on this form indicates that:    1. I have reviewed and understand the above information.  2. My questions regarding this information have been answered to my satisfaction.  3. I have formulated a plan with my discharge nurse to obtain my prescribed medications for home.                  Disclaimer         __________________________________                     __________        ________                       Patient Signature                                                 Date                    Time

## 2017-06-29 LAB
ACT BLD: 153 SEC (ref 74–137)
ANION GAP SERPL CALC-SCNC: 9 MMOL/L (ref 0–11.9)
APTT PPP: 87.7 SEC (ref 24.7–36)
APTT PPP: 97.8 SEC (ref 24.7–36)
BUN SERPL-MCNC: 15 MG/DL (ref 8–22)
CALCIUM SERPL-MCNC: 8.5 MG/DL (ref 8.5–10.5)
CHLORIDE SERPL-SCNC: 110 MMOL/L (ref 96–112)
CHOLEST SERPL-MCNC: 104 MG/DL (ref 100–199)
CO2 SERPL-SCNC: 21 MMOL/L (ref 20–33)
CREAT SERPL-MCNC: 1.1 MG/DL (ref 0.5–1.4)
EKG IMPRESSION: NORMAL
EKG IMPRESSION: NORMAL
ERYTHROCYTE [DISTWIDTH] IN BLOOD BY AUTOMATED COUNT: 46.4 FL (ref 35.9–50)
ERYTHROCYTE [DISTWIDTH] IN BLOOD BY AUTOMATED COUNT: 46.4 FL (ref 35.9–50)
EST. AVERAGE GLUCOSE BLD GHB EST-MCNC: 128 MG/DL
GFR SERPL CREATININE-BSD FRML MDRD: >60 ML/MIN/1.73 M 2
GLUCOSE BLD-MCNC: 105 MG/DL (ref 65–99)
GLUCOSE BLD-MCNC: 112 MG/DL (ref 65–99)
GLUCOSE BLD-MCNC: 122 MG/DL (ref 65–99)
GLUCOSE BLD-MCNC: 149 MG/DL (ref 65–99)
GLUCOSE SERPL-MCNC: 101 MG/DL (ref 65–99)
HBA1C MFR BLD: 6.1 % (ref 0–5.6)
HCT VFR BLD AUTO: 37.3 % (ref 42–52)
HCT VFR BLD AUTO: 37.8 % (ref 42–52)
HDLC SERPL-MCNC: 36 MG/DL
HGB BLD-MCNC: 12.6 G/DL (ref 14–18)
HGB BLD-MCNC: 12.6 G/DL (ref 14–18)
INR PPP: 1.04 (ref 0.87–1.13)
LDLC SERPL CALC-MCNC: 53 MG/DL
LV EJECT FRACT  99904: 50
LV EJECT FRACT MOD 2C 99903: 61.38
LV EJECT FRACT MOD 4C 99902: 46.86
LV EJECT FRACT MOD BP 99901: 57.79
MCH RBC QN AUTO: 31.4 PG (ref 27–33)
MCH RBC QN AUTO: 31.7 PG (ref 27–33)
MCHC RBC AUTO-ENTMCNC: 33.3 G/DL (ref 33.7–35.3)
MCHC RBC AUTO-ENTMCNC: 33.8 G/DL (ref 33.7–35.3)
MCV RBC AUTO: 94 FL (ref 81.4–97.8)
MCV RBC AUTO: 94.3 FL (ref 81.4–97.8)
PLATELET # BLD AUTO: 207 K/UL (ref 164–446)
PLATELET # BLD AUTO: 221 K/UL (ref 164–446)
PMV BLD AUTO: 10.5 FL (ref 9–12.9)
PMV BLD AUTO: 10.8 FL (ref 9–12.9)
POTASSIUM SERPL-SCNC: 4.6 MMOL/L (ref 3.6–5.5)
PROTHROMBIN TIME: 13.9 SEC (ref 12–14.6)
PSA SERPL-MCNC: 0.07 NG/ML (ref 0–4)
PSA SERPL-MCNC: <0.01 NG/ML (ref 0–4)
RBC # BLD AUTO: 3.97 M/UL (ref 4.7–6.1)
RBC # BLD AUTO: 4.01 M/UL (ref 4.7–6.1)
SODIUM SERPL-SCNC: 140 MMOL/L (ref 135–145)
TRIGL SERPL-MCNC: 73 MG/DL (ref 0–149)
TSH SERPL DL<=0.005 MIU/L-ACNC: 1.94 UIU/ML (ref 0.3–3.7)
WBC # BLD AUTO: 8.4 K/UL (ref 4.8–10.8)
WBC # BLD AUTO: 8.9 K/UL (ref 4.8–10.8)

## 2017-06-29 PROCEDURE — 99233 SBSQ HOSP IP/OBS HIGH 50: CPT | Performed by: INTERNAL MEDICINE

## 2017-06-29 PROCEDURE — C1874 STENT, COATED/COV W/DEL SYS: HCPCS

## 2017-06-29 PROCEDURE — 700102 HCHG RX REV CODE 250 W/ 637 OVERRIDE(OP): Performed by: INTERNAL MEDICINE

## 2017-06-29 PROCEDURE — C1769 GUIDE WIRE: HCPCS

## 2017-06-29 PROCEDURE — 93458 L HRT ARTERY/VENTRICLE ANGIO: CPT

## 2017-06-29 PROCEDURE — 83036 HEMOGLOBIN GLYCOSYLATED A1C: CPT

## 2017-06-29 PROCEDURE — C1894 INTRO/SHEATH, NON-LASER: HCPCS

## 2017-06-29 PROCEDURE — A9270 NON-COVERED ITEM OR SERVICE: HCPCS | Performed by: INTERNAL MEDICINE

## 2017-06-29 PROCEDURE — 360979 HCHG DIAGNOSTIC CATH

## 2017-06-29 PROCEDURE — 85610 PROTHROMBIN TIME: CPT

## 2017-06-29 PROCEDURE — 99153 MOD SED SAME PHYS/QHP EA: CPT

## 2017-06-29 PROCEDURE — 700102 HCHG RX REV CODE 250 W/ 637 OVERRIDE(OP)

## 2017-06-29 PROCEDURE — 770020 HCHG ROOM/CARE - TELE (206)

## 2017-06-29 PROCEDURE — 700105 HCHG RX REV CODE 258

## 2017-06-29 PROCEDURE — 700111 HCHG RX REV CODE 636 W/ 250 OVERRIDE (IP)

## 2017-06-29 PROCEDURE — 85347 COAGULATION TIME ACTIVATED: CPT

## 2017-06-29 PROCEDURE — 700101 HCHG RX REV CODE 250

## 2017-06-29 PROCEDURE — 304952 HCHG R 2 PADS

## 2017-06-29 PROCEDURE — 36415 COLL VENOUS BLD VENIPUNCTURE: CPT

## 2017-06-29 PROCEDURE — C1725 CATH, TRANSLUMIN NON-LASER: HCPCS

## 2017-06-29 PROCEDURE — 307093 HCHG TR BAND RADIAL

## 2017-06-29 PROCEDURE — B2151ZZ FLUOROSCOPY OF LEFT HEART USING LOW OSMOLAR CONTRAST: ICD-10-PCS | Performed by: INTERNAL MEDICINE

## 2017-06-29 PROCEDURE — 80061 LIPID PANEL: CPT

## 2017-06-29 PROCEDURE — 700105 HCHG RX REV CODE 258: Performed by: INTERNAL MEDICINE

## 2017-06-29 PROCEDURE — 027034Z DILATION OF CORONARY ARTERY, ONE ARTERY WITH DRUG-ELUTING INTRALUMINAL DEVICE, PERCUTANEOUS APPROACH: ICD-10-PCS | Performed by: INTERNAL MEDICINE

## 2017-06-29 PROCEDURE — 700111 HCHG RX REV CODE 636 W/ 250 OVERRIDE (IP): Performed by: HOSPITALIST

## 2017-06-29 PROCEDURE — 93306 TTE W/DOPPLER COMPLETE: CPT

## 2017-06-29 PROCEDURE — C1887 CATHETER, GUIDING: HCPCS

## 2017-06-29 PROCEDURE — 4A023N7 MEASUREMENT OF CARDIAC SAMPLING AND PRESSURE, LEFT HEART, PERCUTANEOUS APPROACH: ICD-10-PCS | Performed by: INTERNAL MEDICINE

## 2017-06-29 PROCEDURE — 85730 THROMBOPLASTIN TIME PARTIAL: CPT | Mod: 91

## 2017-06-29 PROCEDURE — 84443 ASSAY THYROID STIM HORMONE: CPT

## 2017-06-29 PROCEDURE — A9270 NON-COVERED ITEM OR SERVICE: HCPCS

## 2017-06-29 PROCEDURE — 93306 TTE W/DOPPLER COMPLETE: CPT | Mod: 26 | Performed by: INTERNAL MEDICINE

## 2017-06-29 PROCEDURE — 93005 ELECTROCARDIOGRAM TRACING: CPT | Performed by: INTERNAL MEDICINE

## 2017-06-29 PROCEDURE — C9600 PERC DRUG-EL COR STENT SING: HCPCS | Mod: LC

## 2017-06-29 PROCEDURE — 93010 ELECTROCARDIOGRAM REPORT: CPT | Performed by: INTERNAL MEDICINE

## 2017-06-29 PROCEDURE — 99152 MOD SED SAME PHYS/QHP 5/>YRS: CPT

## 2017-06-29 PROCEDURE — 700117 HCHG RX CONTRAST REV CODE 255: Performed by: INTERNAL MEDICINE

## 2017-06-29 PROCEDURE — 85027 COMPLETE CBC AUTOMATED: CPT | Mod: 91

## 2017-06-29 PROCEDURE — 82962 GLUCOSE BLOOD TEST: CPT | Mod: 91

## 2017-06-29 PROCEDURE — 84153 ASSAY OF PSA TOTAL: CPT

## 2017-06-29 PROCEDURE — 80048 BASIC METABOLIC PNL TOTAL CA: CPT

## 2017-06-29 PROCEDURE — 84153 ASSAY OF PSA TOTAL: CPT | Mod: 91

## 2017-06-29 PROCEDURE — B2111ZZ FLUOROSCOPY OF MULTIPLE CORONARY ARTERIES USING LOW OSMOLAR CONTRAST: ICD-10-PCS | Performed by: INTERNAL MEDICINE

## 2017-06-29 RX ORDER — ONDANSETRON 2 MG/ML
4 INJECTION INTRAMUSCULAR; INTRAVENOUS EVERY 6 HOURS PRN
Status: DISCONTINUED | OUTPATIENT
Start: 2017-06-29 | End: 2017-06-30 | Stop reason: HOSPADM

## 2017-06-29 RX ORDER — SODIUM CHLORIDE 9 MG/ML
INJECTION, SOLUTION INTRAVENOUS
Status: COMPLETED
Start: 2017-06-29 | End: 2017-06-29

## 2017-06-29 RX ORDER — NITROGLYCERIN 0.4 MG/1
0.4 TABLET SUBLINGUAL
Status: DISCONTINUED | OUTPATIENT
Start: 2017-06-29 | End: 2017-06-30 | Stop reason: HOSPADM

## 2017-06-29 RX ORDER — LIDOCAINE HYDROCHLORIDE 20 MG/ML
INJECTION, SOLUTION INFILTRATION; PERINEURAL
Status: COMPLETED
Start: 2017-06-29 | End: 2017-06-29

## 2017-06-29 RX ORDER — PRASUGREL 10 MG/1
10 TABLET, FILM COATED ORAL DAILY
Status: DISCONTINUED | OUTPATIENT
Start: 2017-06-30 | End: 2017-06-30 | Stop reason: HOSPADM

## 2017-06-29 RX ORDER — HEPARIN SODIUM,PORCINE 1000/ML
VIAL (ML) INJECTION
Status: COMPLETED
Start: 2017-06-29 | End: 2017-06-29

## 2017-06-29 RX ORDER — VERAPAMIL HYDROCHLORIDE 2.5 MG/ML
INJECTION, SOLUTION INTRAVENOUS
Status: COMPLETED
Start: 2017-06-29 | End: 2017-06-29

## 2017-06-29 RX ORDER — PRASUGREL 10 MG/1
TABLET, FILM COATED ORAL
Status: COMPLETED
Start: 2017-06-29 | End: 2017-06-29

## 2017-06-29 RX ORDER — SODIUM CHLORIDE 9 MG/ML
INJECTION, SOLUTION INTRAVENOUS CONTINUOUS
Status: DISPENSED | OUTPATIENT
Start: 2017-06-29 | End: 2017-06-29

## 2017-06-29 RX ORDER — MIDAZOLAM HYDROCHLORIDE 1 MG/ML
INJECTION INTRAMUSCULAR; INTRAVENOUS
Status: COMPLETED
Start: 2017-06-29 | End: 2017-06-29

## 2017-06-29 RX ORDER — ACETAMINOPHEN 325 MG/1
325 TABLET ORAL EVERY 4 HOURS PRN
Status: DISCONTINUED | OUTPATIENT
Start: 2017-06-29 | End: 2017-06-30 | Stop reason: HOSPADM

## 2017-06-29 RX ORDER — BIVALIRUDIN 250 MG/5ML
INJECTION, POWDER, LYOPHILIZED, FOR SOLUTION INTRAVENOUS
Status: COMPLETED
Start: 2017-06-29 | End: 2017-06-29

## 2017-06-29 RX ADMIN — METOPROLOL TARTRATE 25 MG: 25 TABLET, FILM COATED ORAL at 20:51

## 2017-06-29 RX ADMIN — VERAPAMIL HYDROCHLORIDE 2.5 MG: 2.5 INJECTION, SOLUTION INTRAVENOUS at 08:08

## 2017-06-29 RX ADMIN — NITROGLYCERIN 10 ML: 20 INJECTION INTRAVENOUS at 08:07

## 2017-06-29 RX ADMIN — SIMVASTATIN 40 MG: 40 TABLET, FILM COATED ORAL at 20:47

## 2017-06-29 RX ADMIN — NITROGLYCERIN 0.5 INCH: 20 OINTMENT TOPICAL at 00:32

## 2017-06-29 RX ADMIN — Medication 30 MG: at 08:41

## 2017-06-29 RX ADMIN — BENAZEPRIL HYDROCHLORIDE 20 MG: 20 TABLET ORAL at 09:16

## 2017-06-29 RX ADMIN — HEPARIN SODIUM: 1000 INJECTION, SOLUTION INTRAVENOUS; SUBCUTANEOUS at 08:10

## 2017-06-29 RX ADMIN — FENTANYL CITRATE 100 MCG: 50 INJECTION, SOLUTION INTRAMUSCULAR; INTRAVENOUS at 08:10

## 2017-06-29 RX ADMIN — NITROGLYCERIN 0.5 INCH: 20 OINTMENT TOPICAL at 05:50

## 2017-06-29 RX ADMIN — METOPROLOL TARTRATE 25 MG: 25 TABLET, FILM COATED ORAL at 13:34

## 2017-06-29 RX ADMIN — MIDAZOLAM 2 MG: 1 INJECTION INTRAMUSCULAR; INTRAVENOUS at 08:10

## 2017-06-29 RX ADMIN — SODIUM CHLORIDE: 9 INJECTION, SOLUTION INTRAVENOUS at 09:17

## 2017-06-29 RX ADMIN — SODIUM CHLORIDE: 9 INJECTION, SOLUTION INTRAVENOUS at 00:15

## 2017-06-29 RX ADMIN — IOHEXOL 115 ML: 350 INJECTION, SOLUTION INTRAVENOUS at 08:41

## 2017-06-29 RX ADMIN — HEPARIN SODIUM 2000 UNITS: 200 INJECTION, SOLUTION INTRAVENOUS at 08:08

## 2017-06-29 RX ADMIN — MAGNESIUM SULFATE IN WATER 2 G: 40 INJECTION, SOLUTION INTRAVENOUS at 00:32

## 2017-06-29 RX ADMIN — SODIUM CHLORIDE: 9 INJECTION, SOLUTION INTRAVENOUS at 05:54

## 2017-06-29 RX ADMIN — LIDOCAINE HYDROCHLORIDE: 20 INJECTION, SOLUTION INFILTRATION; PERINEURAL at 08:08

## 2017-06-29 RX ADMIN — BIVALIRUDIN 250 MG: 250 INJECTION, POWDER, LYOPHILIZED, FOR SOLUTION INTRAVENOUS at 08:37

## 2017-06-29 RX ADMIN — TRAZODONE HYDROCHLORIDE 50 MG: 50 TABLET ORAL at 20:47

## 2017-06-29 RX ADMIN — ASPIRIN 81 MG: 81 TABLET ORAL at 09:16

## 2017-06-29 ASSESSMENT — PAIN SCALES - GENERAL
PAINLEVEL_OUTOF10: 0

## 2017-06-29 ASSESSMENT — ENCOUNTER SYMPTOMS
TINGLING: 0
HEADACHES: 0
DOUBLE VISION: 0
NEUROLOGICAL NEGATIVE: 1
CARDIOVASCULAR NEGATIVE: 1
COUGH: 0
MYALGIAS: 0
PALPITATIONS: 0
DEPRESSION: 0
HEARTBURN: 0
FEVER: 0
BLURRED VISION: 0
NAUSEA: 0
BRUISES/BLEEDS EASILY: 0
CHILLS: 0
BACK PAIN: 0
NECK PAIN: 0
DIZZINESS: 0
WEIGHT LOSS: 0
RESPIRATORY NEGATIVE: 1
HEMOPTYSIS: 0
ABDOMINAL PAIN: 0

## 2017-06-29 NOTE — PROGRESS NOTES
Cardiology Progress Note               Author: Everardo Dugan Date & Time created: 2017  1:23 PM     Interval History:  Admitted last night with small non STEMI.  Stable over nighnt.   High grade Circ stented today. LAD stent is patent.    Review of Systems   Constitutional: Negative for fever.   Respiratory: Negative.    Cardiovascular: Negative.    Neurological: Negative.    Endo/Heme/Allergies: Negative.        Physical Exam   Constitutional: He is oriented to person, place, and time. No distress.   HENT:   Head: Normocephalic and atraumatic.   Cardiovascular: Normal rate and regular rhythm.    No hematoma over arterial puncture site.   Pulmonary/Chest: Effort normal and breath sounds normal. No respiratory distress.   Musculoskeletal: He exhibits no edema.   Neurological: He is alert and oriented to person, place, and time.   Skin: Skin is warm and dry.   Psychiatric: He has a normal mood and affect.       Hemodynamics:  Temp (24hrs), Av.7 °C (98.1 °F), Min:36.5 °C (97.7 °F), Max:37 °C (98.6 °F)  Temperature: 36.6 °C (97.8 °F)  Pulse  Av.1  Min: 48  Max: 79Heart Rate (Monitored): 72  Blood Pressure : 134/68 mmHg, NIBP: 112/54 mmHg     Respiratory:    Respiration: 18, Pulse Oximetry: 98 %           Fluids:  Date 17 0700 - 17 0659   Shift 8583-7747 6589-6623 8292-0916 24 Hour Total   I  N  T  A  K  E   P.O. 240   240    Shift Total 240   240   O  U  T  P  U  T   Shift Total       Weight (kg) 97.5 97.5 97.5 97.5       Weight: 97.52 kg (214 lb 15.9 oz)  GI/Nutrition:  Orders Placed This Encounter   Procedures   • Diet Order     Standing Status: Standing      Number of Occurrences: 1      Standing Expiration Date:      Order Specific Question:  Diet:     Answer:  Cardiac [6]     Lab Results:  Recent Labs      17   0439  17   0522   WBC  8.9  8.4   RBC  4.01*  3.97*   HEMOGLOBIN  12.6*  12.6*   HEMATOCRIT  37.8*  37.3*   MCV  94.3  94.0   MCH  31.4  31.7   MCHC  33.3*  33.8   RDW   46.4  46.4   PLATELETCT  221  207   MPV  10.8  10.5     Recent Labs      06/28/17   1619  06/29/17   0439   SODIUM  139  140   POTASSIUM  4.0  4.6   CHLORIDE  109  110   CO2  21  21   GLUCOSE  71  101*   BUN  17  15   CREATININE  1.15  1.10   CALCIUM  9.0  8.5     Recent Labs      06/28/17   2227  06/29/17   0439  06/29/17   0521  06/29/17   1033   APTT  121.5*  97.8*   --   87.7*   INR   --    --   1.04   --          Recent Labs      06/28/17   1619  06/28/17   2227   TROPONINI  3.11*  2.62*     Recent Labs      06/29/17   0439   TRIGLYCERIDE  73   HDL  36*   LDL  53         Medical Decision Making, by Problem:  Active Hospital Problems    Diagnosis   • CAD (coronary artery disease) [I25.10]   • NSTEMI (non-ST elevated myocardial infarction) (CMS-[facility]) [I21.4]       Plan:  Non STEMI. Circ stented. anticipate discharge tomorrow.  Labs reviewed.  LDL is at target, 51.  Core Measures

## 2017-06-29 NOTE — ASSESSMENT & PLAN NOTE
- h/o MI 2.5 years ago with high-grade LAD lesion was stented and additional lesion of approximately 45%   - continue ASA, statin, BB  - HA1C >6, continue SSI and oral diabetic meds on discharge- will need better control outpatient

## 2017-06-29 NOTE — H&P
HOSPITAL MEDICINE HISTORY/ PHYSICAL    Date & Time note created:    6/28/2017   10:59 PM       Patient ID:   Name: Parvez Scott  . YOB: 1945. Age: 71 y.o. male. MRN: [id number]    Admitting Attending:  Braden Campos     PCP : Dr. Núñez in [place], [place]            Chief Complaint:       Chest pain and lightheadedness    History of Present Illness:    Sonia is a 71 y.o. male w/h/o coronary artery disease with a history of myocardial infarction approximately 2-1/2 years ago where the patient was found with a high-grade LAD lesion that was stented the patient also had a additional lesion of approximately 45% who presents with sudden onset of chest pain while attaching  a trailer to his car. The patient was on vacation in the [place] area for the last 3 weeks, he was getting ready to drive back to [place] when he bends over to attach his trailer to his car. He developed chest pain initially in the epigastric area. He tried to rest and experienced worsening pain, at the time 9 out of 10, and it took 2 aspirin and was taken to the next emergency room, where he was found to have an elevated troponin level, although no ischemic acute EKG changes. The patient's pain lasted approximately 30-45 minutes and then resolved. He had lightheadedness and mild shortness of breath associated but no other symptoms. The patient is already on dual antiplatelet therapy. He has had no other recent chest pain episodes, he is active but does not regularly work out.    Review of Systems:    Has chest pain, lightheadedness and shortness of breath as described above. No other abnormal review of system  Please see HPI, all other systems were reviewed and are negative (AMA/CMS criteria)              Past Medical/ Family / Social history (PFSH):   Past Medical History   Diagnosis Date   • Diabetes (CMS-[facility])    • MI (myocardial infarction) (CMS-[facility])    -history of 97% LAD stenosis status post stenting  -Dyslipidemia  -History  "of prostate cancer  -history of skin cancer primarily basal cell    Past Surgical History   Procedure Laterality Date   • Other cardiac surgery       Cardiac stents LAD   -radical prostatectomy and radiation therapy for recurrence  -Appendectomy  -Herniorrhaphy  Current Outpatient Medications:  Please refer to the medication reconciliation      Medication Allergy/Sensitivities:  No Known Allergies  Family History:  History reviewed. No pertinent family history.  Social History:  Social History   Substance Use Topics   • Smoking status: Former Smoker -- 1.00 packs/day for 0 years   • Smokeless tobacco: None   • Alcohol Use: Yes      Comment: Occasionally     #################################################################  Physical Exam:   Vitals/ General Appearance:   Weight/BMI: Body mass index is 30.85 kg/(m^2).  Blood pressure 139/73, pulse 73, temperature 37 °C (98.6 °F), resp. rate 18, height 1.778 m (5' 10\"), weight 97.52 kg (214 lb 15.9 oz), SpO2 96 %.   Filed Vitals:    06/28/17 1744 06/28/17 1759 06/28/17 1930 06/28/17 2006   BP: 119/58   139/73   Pulse: 71 70 73    Temp:    37 °C (98.6 °F)   Resp:  13 18 18   Height:       Weight:       SpO2:  96% 94% 96%    Oxygen Therapy:  Pulse Oximetry: 96 %, O2 (LPM): 2, O2 Delivery: Silicone Nasal Cannula    Constitutional:  well developed, well nourished, non-toxic, no acute distress  HENMT: Normocephalic, atraumatic, b/l ears normal, nose normal  Eyes:  EOMI, conjunctiva normal, no discharge  Neck: no tracheal deviation, supple  Cardiovascular: normal heart rate, normal rhythm, no murmurs, no rubs or gallops; no cyanosis, clubbing or edema  Lungs: Respiratory effort is normal, normal breath sounds, breath sounds clear to auscultation b/l, no rales, rhonchi or wheezing  Abdomen: soft, non-tender, no guarding or rebound  Skin: warm, dry, no erythema, no rash  Neurologic: Alert and oriented, strength 5/5, no focal deficits, CN II-XII normal  Psychiatric: No anxiety " or depression    #################################################################  Lab Data Review:    Objective  Recent Results (from the past 24 hour(s))   EKG (ER)    Collection Time: 17  4:15 PM   Result Value Ref Range    Report       [facility]    Test Date:  2017  Pt Name:    CHANO PARISI              Department: ER  MRN:        [id number]                      Room:       [facility]  Gender:     F                            Technician: [id number]  :        1945                   Requested By:ANAI WILLOUGHBY  Order #:    [id number]                    Reading MD: ANAI WILLOUGHBY MD    Measurements  Intervals                                Axis  Rate:       65                           P:          1  MS:         172                          QRS:        -48  QRSD:       90                           T:          -5  QT:         408  QTc:        425    Interpretive Statements  SINUS RHYTHM  LEFT ANTERIOR FASCICULAR BLOCK  LATE PRECORDIAL R/S TRANSITION  CONSIDER LEFT VENTRICULAR HYPERTROPHY  BORDERLINE T ABNORMALITIES, INFERIOR LEADS  No previous ECG available for comparison    Electronically Signed On 2017 16:19:29 PDT by ANAI WILLOUGHBY MD     TROPONIN    Collection Time: 17  4:19 PM   Result Value Ref Range    Troponin I 3.11 (H) 0.00 - 0.04 ng/mL   Basic Metabolic Panel (BMP)    Collection Time: 17  4:19 PM   Result Value Ref Range    Sodium 139 135 - 145 mmol/L    Potassium 4.0 3.6 - 5.5 mmol/L    Chloride 109 96 - 112 mmol/L    Co2 21 20 - 33 mmol/L    Glucose 71 65 - 99 mg/dL    Bun 17 8 - 22 mg/dL    Creatinine 1.15 0.50 - 1.40 mg/dL    Calcium 9.0 8.5 - 10.5 mg/dL    Anion Gap 9.0 0.0 - 11.9   MAGNESIUM    Collection Time: 17  4:19 PM   Result Value Ref Range    Magnesium 1.4 (L) 1.5 - 2.5 mg/dL   ESTIMATED GFR    Collection Time: 17  4:19 PM   Result Value Ref Range    GFR If African American >60 >60 mL/min/1.73 m 2    GFR If  Non African American >60 >60 mL/min/1.73 m 2   ACCU-CHEK GLUCOSE    Collection Time: 17  8:53 PM   Result Value Ref Range    Glucose - Accu-Ck 198 (H) 65 - 99 mg/dL   EKG in four (4) hours    Collection Time: 17 10:23 PM   Result Value Ref Range    Report       [facility]    Test Date:  2017  Pt Name:    CHANO PARISI              Department: ER  MRN:        [id number]                      Room:       T834  Gender:     M                            Technician: OLGA LIDIA  :        1945                   Requested By:JOHN ALLEN  Order #:    [id number]                    Reading MD:    Measurements  Intervals                                Axis  Rate:       57                           P:          21  OK:         176                          QRS:        -49  QRSD:       94                           T:          -37  QT:         440  QTc:        429    Interpretive Statements  SINUS BRADYCARDIA  LEFT ANTERIOR FASCICULAR BLOCK  ABNORMAL T, CONSIDER ISCHEMIA, INFERIOR LEADS  Compared to ECG 2017 16:15:26  Possible ischemia now present  Sinus rhythm no longer present  T-wave abnormality still present         (click the triangle to expand results)    Imaging/Procedures Review:    OUTSIDE IMAGES-DX CHEST   Preliminary Result      Echocardiogram Comp W/O Cont    (Results Pending)     EKG:   Sinus rhythm at a rate of 65 without acute ST-T changes, left anterior fascicular block, poor R-wave progression. No ST elevation  Assessment and Plan:      1. Non-ST elevation myocardial infarction, but a history of coronary artery disease on dual antiplatelet therapy  2. Prior LAD stents with a high-grade stenosis  3. Additional coronary artery disease with reported 45% stenosis   4. Dyslipidemia  5. History of prostate cancer  6. Dietary obesity  7. History of extensive tobacco abuse    Overall plan, the patient is admitted with a non-ST elevation myocardial infarction, he was seen by cardiology,  he is started on a heparin drip on top of antiplatelet therapy. He will be closely monitored on telemetry. Plan is for coronary angiography in the morning. If his status should worsen emergent catheterization to be undertaken.    8. Prophylaxis: heparin drip  9. Code: Full code   10. Dispo:The Patient will be admitted to inpatient for management that is expected to take greater than 2 midnights

## 2017-06-29 NOTE — CATH LAB
Immediate Post-Operative Note      PreOp Diagnosis: NSTEMI, prior LAD stent    PostOp Diagnosis: 99% mid LCX, patent LAD stent, 30% mid RCA, amid-distal inf hypokinesis, EF 65%  S/p Xience (3.5x15 mm MARINA) to LCX 0% residual, VARGHESE III    Procedure(s) :  Coronary Angiography, Left Heart Catheterization, Left Ventriculography  PCI LCX    Surgeon(s):  Elisa Quesada M.D.    Type of Anesthesia: Moderate Sedation    Specimen: None    Estimated Blood Loss: none cc's      Findings: As above    Complications: none      Elisa Quesada M.D.  6/29/2017 8:44 AM

## 2017-06-29 NOTE — CARE PLAN
Problem: Safety  Goal: Will remain free from falls  Outcome: PROGRESSING AS EXPECTED  Pt oriented to the room and call system. Pt educated to call if feeling dizzy, light headed or experiencing chest pain. Pt placed in non skid foot wear.     Problem: Knowledge Deficit  Goal: Knowledge of disease process/condition, treatment plan, diagnostic tests, and medications will improve  Intervention: Explain information regarding disease process/condition, treatment plan, diagnostic tests, and medications and document in education  Pt educated to planned coronary angiography planned in the morning. Pt verbalized understanding.

## 2017-06-29 NOTE — CARE PLAN
Problem: Safety  Goal: Will remain free from injury  Bed locked and in lowest position. Patient up self, refuses bed alarm Treaded socks. Call light and belongings within reach. Patient educated to call for assistance. Pt verbalized understanding. Hourly rounding in place.     Problem: Knowledge Deficit  Goal: Knowledge of disease process/condition, treatment plan, diagnostic tests, and medications will improve  Discussed POC and medications with patient, pt. verbalized understanding.

## 2017-06-29 NOTE — PROGRESS NOTES
Hospital Medicine Interval Note  Date of Service:  6/29/2017    Chief Complaint  71 y.o.-year-old male admitted 6/28/2017 with h/o MI 2.5 years ago with high grade LAD lesion that was stented. Presented with chest pain, dizziness, SOB. Elevated trops. Treated for NSTEMI, stent placed in cath lab today.     Interval Problem Update  Doing well, CP minimal, nearly resolved    Consultants/Specialty  Cardiology    Disposition  Home likely tomorrow when Cards clears        Review of Systems   Constitutional: Negative for fever, chills, weight loss and malaise/fatigue.   Eyes: Negative for blurred vision and double vision.   Respiratory: Negative for cough and hemoptysis.    Cardiovascular: Negative for chest pain and palpitations.   Gastrointestinal: Negative for heartburn, nausea and abdominal pain.   Genitourinary: Negative for dysuria and frequency.   Musculoskeletal: Negative for myalgias, back pain and neck pain.   Skin: Negative.    Neurological: Negative for dizziness, tingling and headaches.   Endo/Heme/Allergies: Does not bruise/bleed easily.   Psychiatric/Behavioral: Negative for depression and suicidal ideas.      Physical Exam Laboratory/Imaging   Filed Vitals:    06/29/17 1140 06/29/17 1210 06/29/17 1310 06/29/17 1334   BP: 104/59 105/60 134/68    Pulse: 77 63 71 71   Temp:  36.6 °C (97.8 °F)     Resp:  18     Height:       Weight:       SpO2:  95% 98%      Physical Exam   Constitutional: He is oriented to person, place, and time. He appears well-developed and well-nourished. No distress.   HENT:   Head: Normocephalic and atraumatic.   Eyes: Pupils are equal, round, and reactive to light. Right eye exhibits no discharge.   Neck: Normal range of motion. Neck supple.   Cardiovascular: Normal rate and regular rhythm.  Exam reveals no gallop.    No murmur heard.  Pulmonary/Chest: Effort normal and breath sounds normal. No respiratory distress. He has no wheezes. He has no rales.   Abdominal: Soft. Bowel sounds  are normal. He exhibits no distension. There is no tenderness.   Musculoskeletal: Normal range of motion. He exhibits no edema or tenderness.   Neurological: He is alert and oriented to person, place, and time.   Skin: Skin is warm and dry. He is not diaphoretic.   Psychiatric: He has a normal mood and affect.    Lab Results   Component Value Date/Time    WBC 8.4 06/29/2017 05:22 AM    HEMOGLOBIN 12.6* 06/29/2017 05:22 AM    HEMATOCRIT 37.3* 06/29/2017 05:22 AM    PLATELET COUNT 207 06/29/2017 05:22 AM     Lab Results   Component Value Date/Time    SODIUM 140 06/29/2017 04:39 AM    POTASSIUM 4.6 06/29/2017 04:39 AM    CHLORIDE 110 06/29/2017 04:39 AM    CO2 21 06/29/2017 04:39 AM    GLUCOSE 101* 06/29/2017 04:39 AM    BUN 15 06/29/2017 04:39 AM    CREATININE 1.10 06/29/2017 04:39 AM      Assessment/Plan    NSTEMI (non-ST elevated myocardial infarction) (CMS-[facility])  Assessment & Plan  - chest pain with elevated trops   - Cards following, s/p coronary angio today with stent placed  - watch overnight and likely d/c tomorrow when Cards clears  - ASA, effient     CAD (coronary artery disease)  Assessment & Plan  - h/o MI 2.5 years ago with high-grade LAD lesion was stented and additional lesion of approximately 45%   - continue ASA, statin, BB  - HA1C >6, continue SSI and oral diabetic meds on discharge- will need better control outpatient      EKG reviewed, Labs reviewed, Medications reviewed and Radiology images reviewed  Verma catheter: No Verma      DVT: effient   DVT prophylaxis - mechanical: Not indicated at this time, ambulatory  Ulcer prophylaxis: Not indicated

## 2017-06-29 NOTE — CATH LAB
CARDIAC CATHETERIZATION REPORT    Date of Procedure June 29, 2017    Pre-Operative Diagnosis: Non-ST segment elevation myocardial infarction with history of LAD stent    Post-Operative Diagnoses:   1. Non-ST segment elevation myocardia infarction due to 99% stenosis in the mid left circumflex artery  2. Patent left anterior descending artery stent and no significant disease in the right coronary artery with 40-50% proximal first obtuse marginal branch stenosis  3. Mid-distal inferior wall akinesis with normal left ventricular systolic function. Ejection fraction is 68%  4. Status post stenting of the left circumflex artery with 3.5x15 mm Xience Alpine drug eluting stent    Procedure(s) :  Coronary Angiography, Left Heart Catheterization, Left Ventriculography  Percutaneous coronary intervention of the left circumflex artery    Surgeon(s):  Elisa Quesada M.D.    Complication : None    Description of the procedure:    After inform consent was obtained, the patient was brought to cardiac catheterization laboratory in fasting state.   Jason test was carried out on the right hand and was found to be negative.  Right wrist and right groin were then prepped and drapped in sterile fashion.  Versed and fentanyl were used for conscious sedation.  A 6 Iranian Terumo sheath was then placed in the right radial artery using Seldinger technique.  A 2.5 mg of verapamil, 100 microgram of nitroglycerine and 4000 units of heparin were administered into the radial sheath.    Selective angiography of the right and left coronary artery were then performed in multiple views using 5 Iranian TIG catheter.  Left ventriculography was performed using 30 cc of contrast injected over 3 seconds using pigtail catheter.  Left heart pullback was then performed.  The catheter was then removed.  Radial sheath was subsequently removed. Hemostasis was obtained using TR wrist band.  The patient tolerated procedure well and left cardiac  catheterization laboratory in stable condition.      After reviewing diagnostic angiography, it was felt that intervention of the left circumflex artery was indicated.  Bivalrudin was started for anticoagulation.  6 Tamazight EBU 3.75 guide catheter was used.  0.014 BMW wire was then advanced past the stenopsis.  The lesion was dilated with 3x12 mm balloon.  A 3.5x15 Xience Alpine drug eluting stent was deployed at 12 ATMs.  Subsequent angiography showed good result. The guide wire was then removed.  The final angiography was performed which confirmed good result.  The guiding catheter was then removed. Radial sheath was then removed.  Hemostasis was obtained using Terumo TR wrist band.  The patient was given loading dose of prasugrel.  Bivalirudin was reduced to low dose infusion.  The patient tolerated procedure well and left cardiac catheterization laboratory in stable condition.    Findings:     There is no gradient across aortic valve.  Left ventricular end-diastolic pressure was normal.    Left ventriculography showed hypokinesis of mid-distal inferior wall.  Ejection fraction was normal at 68%.    This is right dominant system.    Left main is large and without flow limiting disease.  It bifurcated into left anterior descending and left circumflex artery.    Left anterior descending artery is large caliber vessel and extends to the apex.  It gives rises to one medium and one large diagonal branches.  There is stent noted in proximal/mid portion. The stent is widely patent.  There is no signficant disease in the left anterior descending artery.  The antegrade flow is normal.    Left circumflex artery is large caliber. It gives rise to several obtuse marginal branches.  There was 99% stenosis in mid/distal left circumflex artery (LCX).  There is also 40-50% stenosis in large OM1.  The antegrade flow is normal.    Right coronary is large caliber. It gives rise to several medium sized acute marginal branch,  posterior descending artery and posterolateral branch.  There is no significant disease in the right coronary artery.  The antegrade flow is normal.    After intervention, there is no residual stenosis in the mid left circumflex artery with normal antegrade flow.    Plan: Dual antiplatelet therapy for one year or more. Risk factor modifications.      Complications: none      Elisa Quesada M.D.  6/29/2017 2:56 PM

## 2017-06-29 NOTE — PROGRESS NOTES
Pt arrived by gil with no c/o chest pain. Pt placed on monitor and verified by monitor room SR in 74. Pt has nitro patch on. VSS. Pt oriented to unit policies nad expectations and verbalized understanding. Updated to POC r/t cath procedure in am. Pt verbalized understanding. Bed locked in low position with fall precautions in place. Call light in place with bed side table in reach.

## 2017-06-29 NOTE — ASSESSMENT & PLAN NOTE
- chest pain with elevated trops   - Cards following, s/p coronary angio today with stent placed  - watch overnight and likely d/c tomorrow when Cards clears  - ASA, effient

## 2017-06-29 NOTE — PROGRESS NOTES
Patient back in room from procedure, VS'S. Currently on 2 L via [place]. Angiomax running at 3.9 ml/hr, right radial access hemoband in place. pt placed back on telemetry monitor, pt educated on limiting wrist movement.

## 2017-06-29 NOTE — PROGRESS NOTES
Cardiology Progress Note               Author: Everardo Dugan Date & Time created: 2017  5:46 PM     Interval History:  This is a cardiology consultation dictated on the Epic progress note format as the dictation system the hospital is currently not functioning.    The patient is seen in the emergency room with request of Dr. Degroot for evaluation of chest pain. He has history of known coronary artery disease with stenting of his mid LAD with a 2.5×18 mm Xience Alpine stent in 2014. This was performed at [facility] in [facility]. The patient has been on dual antiplatelet therapy since that time has had no angina or other problems at all.  Today about noon, he had just finished looking up his travel trailer was walking around to get in to his truck when he developed mid anterior chest pain. This was very reminiscent of the pain he had just prior to his stent in . The discomfort was an aching pressure in his mid anterior chest without radiation to his neck and jaw or back. He was mildly short of breath with this and he rated the pain at Price over 10 at its worst point. Paramedics were summoned but by the time they got there his pain had largely abated. He estimates his discomfort lasted for 30-45 minutes before going away.  Initial troponin in [facility] ER was positive troponin here is positive for slightly over 3. He's had no recurrence of his pain.  Cardiac risk factor profile positive for diabetes mellitus hypertension and hyperlipidemia. No history is smoking since 2014. No family history of premature coronary artery disease.  Family history: Father's history unknown. Mother  at age 80. She had diabetes.  Illnesses: Hypertension, diabetes mellitus type II, coronary artery disease, hyperlipidemia.  Surgeries: Appendectomy, prostatectomy, removal of skin cancer from scalp.  Social history: , retired, has not smoked since 2014.  Review of Systems    Constitutional: Negative.  Negative for malaise/fatigue.   Respiratory: Negative for shortness of breath.    Cardiovascular: Positive for chest pain. Negative for palpitations and leg swelling.   Gastrointestinal: Negative for nausea and vomiting.   Neurological: Negative.  Negative for weakness.   Endo/Heme/Allergies: Does not bruise/bleed easily.   Psychiatric/Behavioral: Negative for depression. The patient has insomnia.        Physical Exam   Constitutional: She is oriented to person, place, and time. She appears well-developed and well-nourished. No distress.   HENT:   Head: Normocephalic and atraumatic.   Eyes: Conjunctivae and EOM are normal. Pupils are equal, round, and reactive to light.   Neck: Neck supple. No JVD present.   Cardiovascular: Normal rate and regular rhythm.    No murmur heard.  Pulmonary/Chest: Effort normal and breath sounds normal. No respiratory distress. She has no wheezes. She has no rales.   Abdominal: Soft. There is no tenderness.   Musculoskeletal: She exhibits no edema.   Neurological: She is alert and oriented to person, place, and time.   Skin: Skin is warm and dry. She is not diaphoretic.   Psychiatric: She has a normal mood and affect.       Hemodynamics:  Temp (24hrs), Av °C (98.6 °F), Min:37 °C (98.6 °F), Max:37 °C (98.6 °F)  Temperature: 37 °C (98.6 °F)  Pulse  Av  Min: 71  Max: 79   Blood Pressure : 119/58 mmHg     Respiratory:    Respiration: 16, Pulse Oximetry: 97 %           Fluids:     Weight: 97.52 kg (214 lb 15.9 oz)  GI/Nutrition:  Orders Placed This Encounter   Procedures   • Diet NPO after Midnight (Give all morning meds)     Standing Status: Standing      Number of Occurrences: 8      Standing Expiration Date:      Order Specific Question:  Restrict to:     Answer:  Sips with Medications [3]     Lab Results:                  Recent Labs      17   1619   TROPONINI  3.11*             Medical Decision Making, by Problem:  There are no hospital  problems to display for this patient.      Plan:    EKG: Personally reviewed. Sinus rhythm, left anterior hemiblock inverted nonspecific inferior T-wave abnormalities. No ST elevation.    Impression:  Non-STEMI: The patient is currently pain-free. Troponin is positive. His discomfort was very reminiscent of his pre-stent symptoms. The patient is on dual antiplatelet therapy. He was started on heparin, nitrates and beta blockers. If the patient has recurrent chest pain we taken emergently to the cath lab tonight otherwise will plan angiography 1st thing in the morning.  Hypertension: His benazepril dose will be reduced as he is being started on beta blockers.  Diabetes mellitus:  Hyperlipidemia: Check lipid profile.  History of prostate cancer: Check PSA.    Core Measures

## 2017-06-29 NOTE — PROGRESS NOTES
Bedside report received. No overnight events. Patient A&O x 4. Currently on 2L via [place] No complaints of pain at this time. Per night RN patient received first dose of metoprolol 25 mg last night which dropped his HR down as low as 46. NPO since midnight for cardiac cath. Heparin gtt running at 1050 units/hr next aptt at 1030 this morning. POC discussed with patient. Pt verbalizes understanding. Call light and belongings with in reach. Bed locked and in lowest position, alarm and fall precautions in place.

## 2017-06-29 NOTE — PROGRESS NOTES
Cardiology Progress Note               Author: Emanuel Franklin Date & Time created: 2017  12:22 PM     Interval History:  Consultation for non-STEMI    Admitted with chest pain      History of coronary artery disease status post stenting of mid LAD in 2014 in [facility] hypertension, diabetes, hyperlipidemia, prostate cancer, former extensive tobacco abuse, obesity    Labs reviewed  Na, K, CR stable   CBC stable        BP =129/66  HR =40-60 idioventricular beats (2-4 beats), asymptomatic    Coronary angiography 17, 99% mid left circumflex, patent LAD stent, 30% mid RCA, EF 65 (amid-distal inferior hypokinesis) status post drug-eluting stent to left circumflex      Echo 17, LVEF 45-50, inferolateral lateral wall hypokinesis, no evidence of valvular abnormality    Review of Systems   Respiratory: Negative for cough and shortness of breath.    Cardiovascular: Negative for chest pain, palpitations, orthopnea, claudication, leg swelling and PND.       Physical Exam   Constitutional: He is oriented to person, place, and time. He appears well-developed.   Eyes: Conjunctivae are normal.   Neck: No JVD present. No thyromegaly present.   Pulmonary/Chest: He has no wheezes.   Abdominal: Soft.   Musculoskeletal: He exhibits no edema.   Neurological: He is oriented to person, place, and time.   Skin: Skin is warm and dry.   Right radial catheter site clean/dry/intact  Good circulation       Hemodynamics:  Temp (24hrs), Av.7 °C (98.1 °F), Min:36.5 °C (97.7 °F), Max:37 °C (98.6 °F)  Temperature: 36.6 °C (97.8 °F)  Pulse  Av  Min: 48  Max: 79Heart Rate (Monitored): 72  Blood Pressure : 105/60 mmHg, NIBP: 112/54 mmHg     Respiratory:    Respiration: 18, Pulse Oximetry: 95 %           Fluids:  Date 17 0700 - 17 0659   Shift 8444-5553 3987-7695 7646-7045 24 Hour Total   I  N  T  A  K  E   P.O. 0   0    Shift Total 0   0   O  U  T  P  U  T   Shift Total        Weight (kg) 97.5 97.5 97.5 97.5       Weight: 97.52 kg (214 lb 15.9 oz)  GI/Nutrition:  Orders Placed This Encounter   Procedures   • Diet Order     Standing Status: Standing      Number of Occurrences: 1      Standing Expiration Date:      Order Specific Question:  Diet:     Answer:  Cardiac [6]     Lab Results:  Recent Labs      06/29/17   0439  06/29/17   0522   WBC  8.9  8.4   RBC  4.01*  3.97*   HEMOGLOBIN  12.6*  12.6*   HEMATOCRIT  37.8*  37.3*   MCV  94.3  94.0   MCH  31.4  31.7   MCHC  33.3*  33.8   RDW  46.4  46.4   PLATELETCT  221  207   MPV  10.8  10.5     Recent Labs      06/28/17   1619  06/29/17   0439   SODIUM  139  140   POTASSIUM  4.0  4.6   CHLORIDE  109  110   CO2  21  21   GLUCOSE  71  101*   BUN  17  15   CREATININE  1.15  1.10   CALCIUM  9.0  8.5     Recent Labs      06/28/17   2227  06/29/17   0439  06/29/17   0521  06/29/17   1033   APTT  121.5*  97.8*   --   87.7*   INR   --    --   1.04   --          Recent Labs      06/28/17   1619  06/28/17   2227   TROPONINI  3.11*  2.62*     Recent Labs      06/29/17   0439   TRIGLYCERIDE  73   HDL  36*   LDL  53         Medical Decision Making, by Problem:  Active Hospital Problems    Diagnosis   • CAD (coronary artery disease) [I25.10]   • NSTEMI (non-ST elevated myocardial infarction) (CMS-[facility]) [I21.4]       Plan:    NSTEMI ( trop peaked at 3)     s/p MARINA to LCX, patent LAD stent ( 6/29/17 )     EF 65%, inf hypokinesis    Continue  with DAPT ( ASA  & Effient ) x > 12 months    Continue with Lotensin, Coreg, statin    Follow-up with cardiologist in 7-10 days in [facility] for DC     Medications reviewed and Labs reviewed

## 2017-06-30 VITALS
HEART RATE: 56 BPM | RESPIRATION RATE: 18 BRPM | TEMPERATURE: 98.8 F | DIASTOLIC BLOOD PRESSURE: 66 MMHG | HEIGHT: 70 IN | OXYGEN SATURATION: 94 % | WEIGHT: 205.03 LBS | BODY MASS INDEX: 29.35 KG/M2 | SYSTOLIC BLOOD PRESSURE: 129 MMHG

## 2017-06-30 PROBLEM — I21.4 NSTEMI (NON-ST ELEVATED MYOCARDIAL INFARCTION) (HCC): Status: RESOLVED | Noted: 2017-06-28 | Resolved: 2017-06-30

## 2017-06-30 LAB
ANION GAP SERPL CALC-SCNC: 6 MMOL/L (ref 0–11.9)
BUN SERPL-MCNC: 13 MG/DL (ref 8–22)
CALCIUM SERPL-MCNC: 8.6 MG/DL (ref 8.5–10.5)
CHLORIDE SERPL-SCNC: 110 MMOL/L (ref 96–112)
CO2 SERPL-SCNC: 22 MMOL/L (ref 20–33)
CREAT SERPL-MCNC: 1.12 MG/DL (ref 0.5–1.4)
ERYTHROCYTE [DISTWIDTH] IN BLOOD BY AUTOMATED COUNT: 46.2 FL (ref 35.9–50)
GFR SERPL CREATININE-BSD FRML MDRD: >60 ML/MIN/1.73 M 2
GLUCOSE BLD-MCNC: 114 MG/DL (ref 65–99)
GLUCOSE SERPL-MCNC: 116 MG/DL (ref 65–99)
HCT VFR BLD AUTO: 39.3 % (ref 42–52)
HGB BLD-MCNC: 13.2 G/DL (ref 14–18)
MCH RBC QN AUTO: 31.7 PG (ref 27–33)
MCHC RBC AUTO-ENTMCNC: 33.6 G/DL (ref 33.7–35.3)
MCV RBC AUTO: 94.2 FL (ref 81.4–97.8)
PLATELET # BLD AUTO: 207 K/UL (ref 164–446)
PMV BLD AUTO: 10.5 FL (ref 9–12.9)
POTASSIUM SERPL-SCNC: 4.2 MMOL/L (ref 3.6–5.5)
RBC # BLD AUTO: 4.17 M/UL (ref 4.7–6.1)
SODIUM SERPL-SCNC: 138 MMOL/L (ref 135–145)
WBC # BLD AUTO: 8.5 K/UL (ref 4.8–10.8)

## 2017-06-30 PROCEDURE — 700102 HCHG RX REV CODE 250 W/ 637 OVERRIDE(OP): Performed by: INTERNAL MEDICINE

## 2017-06-30 PROCEDURE — 85027 COMPLETE CBC AUTOMATED: CPT

## 2017-06-30 PROCEDURE — 36415 COLL VENOUS BLD VENIPUNCTURE: CPT

## 2017-06-30 PROCEDURE — 82962 GLUCOSE BLOOD TEST: CPT

## 2017-06-30 PROCEDURE — 80048 BASIC METABOLIC PNL TOTAL CA: CPT

## 2017-06-30 PROCEDURE — A9270 NON-COVERED ITEM OR SERVICE: HCPCS | Performed by: INTERNAL MEDICINE

## 2017-06-30 PROCEDURE — 99239 HOSP IP/OBS DSCHRG MGMT >30: CPT | Performed by: INTERNAL MEDICINE

## 2017-06-30 RX ORDER — CARVEDILOL 3.12 MG/1
3.12 TABLET ORAL 2 TIMES DAILY WITH MEALS
Qty: 60 TAB | Refills: 0 | Status: SHIPPED | OUTPATIENT
Start: 2017-06-30

## 2017-06-30 RX ORDER — CARVEDILOL 3.12 MG/1
3.12 TABLET ORAL 2 TIMES DAILY WITH MEALS
Status: DISCONTINUED | OUTPATIENT
Start: 2017-06-30 | End: 2017-06-30 | Stop reason: HOSPADM

## 2017-06-30 RX ORDER — BENAZEPRIL HYDROCHLORIDE 20 MG/1
20 TABLET ORAL DAILY
Qty: 30 TAB | Refills: 0 | Status: SHIPPED | OUTPATIENT
Start: 2017-06-30

## 2017-06-30 RX ADMIN — ASPIRIN 81 MG: 81 TABLET ORAL at 08:25

## 2017-06-30 RX ADMIN — BENAZEPRIL HYDROCHLORIDE 20 MG: 20 TABLET ORAL at 08:25

## 2017-06-30 RX ADMIN — CARVEDILOL 3.12 MG: 3.12 TABLET, FILM COATED ORAL at 11:25

## 2017-06-30 RX ADMIN — PRASUGREL HYDROCHLORIDE 10 MG: 10 TABLET, FILM COATED ORAL at 08:26

## 2017-06-30 ASSESSMENT — ENCOUNTER SYMPTOMS
SHORTNESS OF BREATH: 0
PND: 0
CLAUDICATION: 0
ORTHOPNEA: 0
COUGH: 0
PALPITATIONS: 0

## 2017-06-30 ASSESSMENT — PAIN SCALES - GENERAL: PAINLEVEL_OUTOF10: 0

## 2017-06-30 ASSESSMENT — LIFESTYLE VARIABLES: EVER_SMOKED: YES

## 2017-06-30 NOTE — PROGRESS NOTES
Report received by day shift RN. Pt sitting up at the edge of bed with family at bed side. No c/o pain. Right radial sheath site CDI with CMS intact and cap refill less than 3 sec. Call light in place with fall precautions in place. Bed side table in reach with Call light in place.

## 2017-06-30 NOTE — DISCHARGE SUMMARY
CHIEF COMPLAINT ON ADMISSION  Chief Complaint   Patient presents with   • Chest Pain     Transfer from [place], chest pain at 0830, resolved after 20 minutes       CODE STATUS  Full Code    HPI & HOSPITAL COURSE  This is a 71 y.o. male here with chest pain and lightheadedness. Pt with hx of CAD, known lesion at 45%, as well as a significant LAD lesion that was stented.  Pt noted to have changed on EKG and significant troponin elevation. Heparin gtt stated and pt went to cath. Noted 99% stenosis of left circ, now s/p stenting. Pt now chest pain free. Discussed with cardiology who agreed with discharge. Pt stated on metoprolol but overnight had bradycardia, this was switched to coreg.  Pt admitted 6/28/17, discharged 6/30/17.    Therefore, he is discharged in good and stable condition with close outpatient follow-up.    SPECIFIC OUTPATIENT FOLLOW-UP  F/U with PCP and cards upon arrival home    DISCHARGE PROBLEM LIST  Active Problems:    CAD (coronary artery disease) POA: Unknown  Resolved Problems:    NSTEMI (non-ST elevated myocardial infarction) (CMS-[facility]) POA: Unknown      FOLLOW UP  No future appointments.  PCP     Schedule an appointment as soon as possible for a visit in 1 week        MEDICATIONS ON DISCHARGE   Parvez Scott   Home Medication Instructions PRISCILA:[id number]    Printed on:06/30/17 7097   Medication Information                      aspirin (ASA) 81 MG Chew Tab chewable tablet  Take 81 mg by mouth every day.             benazepril (LOTENSIN) 20 MG Tab  Take 1 Tab by mouth every day.             carvedilol (COREG) 3.125 MG Tab  Take 1 Tab by mouth 2 times a day, with meals.             glimepiride (AMARYL) 2 MG Tab  Take 2 mg by mouth every day.             metformin (GLUCOPHAGE) 500 MG Tab  Take 500 mg by mouth 2 times a day, with meals.             prasugrel (EFFIENT) 10 MG Tab  Take 10 mg by mouth every day.             simvastatin (ZOCOR) 40 MG Tab  Take 40 mg by mouth every evening.              trazodone (DESYREL) 50 MG Tab  Take 50 mg by mouth every evening.                 DIET  Orders Placed This Encounter   Procedures   • Diet Order     Standing Status: Standing      Number of Occurrences: 1      Standing Expiration Date:      Order Specific Question:  Diet:     Answer:  Cardiac [6]       ACTIVITY  As tolerated.  Weight bearing as tolerated      CONSULTATIONS  Cards - Dr Dugan    PROCEDURES  Cardiac cath with 99% occlusion left circ, s/p stenting    LABORATORY  Lab Results   Component Value Date/Time    SODIUM 138 06/30/2017 03:32 AM    POTASSIUM 4.2 06/30/2017 03:32 AM    CHLORIDE 110 06/30/2017 03:32 AM    CO2 22 06/30/2017 03:32 AM    GLUCOSE 116* 06/30/2017 03:32 AM    BUN 13 06/30/2017 03:32 AM    CREATININE 1.12 06/30/2017 03:32 AM        Lab Results   Component Value Date/Time    WBC 8.5 06/30/2017 03:32 AM    HEMOGLOBIN 13.2* 06/30/2017 03:32 AM    HEMATOCRIT 39.3* 06/30/2017 03:32 AM    PLATELET COUNT 207 06/30/2017 03:32 AM        Total time of the discharge process exceeds 45 minutes

## 2017-06-30 NOTE — DISCHARGE INSTRUCTIONS
Discharge Instructions    Discharged to home by car with relative. Discharged via walking, hospital escort: Refused.  Special equipment needed: Not Applicable    Be sure to schedule a follow-up appointment with your primary care doctor or any specialists as instructed.     Discharge Plan:   Diet Plan: Discussed  Activity Level: Discussed  Confirmed Follow up Appointment: Patient to Call and Schedule Appointment  Confirmed Symptoms Management: Discussed  Medication Reconciliation Updated: Yes  Influenza Vaccine Indication: Patient Refuses    I understand that a diet low in cholesterol, fat, and sodium is recommended for good health. Unless I have been given specific instructions below for another diet, I accept this instruction as my diet prescription.   Other diet: Cardiac, Diabetic     Special Instructions: Diagnosis:  Acute Coronary Syndrome (ACS) is a diagnosis that encompasses cardiac-related chest pain and heart attack. ACS occurs when the blood flow to the heart muscle is severely reduced or cut off completely due to a slow process called atherosclerosis.  Atherosclerosis is a disease in which the coronary arteries become narrow from a buildup of fat, cholesterol, and other substances that combine to form plaque. If the plaque breaks, a blood clot will form and block the blood flow to the heart muscle. This lack of blood flow can cause damage or death to the heart muscle which is called a heart attack or Myocardial Infarction (MI). There are two different types of MIs:  ST Elevation Myocardial Infarction or STEMI (the most severe type of heart attack) and Non-ST Elevation Myocardial Infarction or NSTEMI.    Treatment Plan:  · Cardiac Diet  - Low fat, low salt, low cholesterol   · Cardiac Rehab  - Your doctor has ordered you a referral to [facility].  Call [phone] to schedule an appointment.  · Attend my follow-up appointment with my Cardiologist.  · Take my medications as prescribed by my doctor  · Exercise  daily  · Lower my bad cholesterol and raise my good cholesterol, lower my blood pressure, Reduce stress and Control my diabetes    Medications:  Certain medications are used to treat ACS.  Remember to always take medications as prescribed and never stop talking medications unless told by your doctor.    You have been prescribed the following medicatons:    Aspirin - Aspirin is used as a blood thinning medication and you will require this medication indefinitely.  Anti-platelet/blood thinner - Your Anti-platelet/Blood thinning medication is called Effient, and is used in combination with aspirin to prevent clots from forming in your heart and/or around your stent.  Your doctor will determine how long you need to be on this medicine.  Beta-Blocker - Beta-Blocker carvedilol is used to lower blood pressure and heart rate, and/or helps your heart heal after a heart attack.  Statin - Statin simvastatin is used to lower cholesterol.    · Is patient discharged on Warfarin / Coumadin?   No     · Is patient Post Blood Transfusion?  No    Depression / Suicide Risk    As you are discharged from this [facility] facility, it is important to learn how to keep safe from harming yourself.    Recognize the warning signs:  · Abrupt changes in personality, positive or negative- including increase in energy   · Giving away possessions  · Change in eating patterns- significant weight changes-  positive or negative  · Change in sleeping patterns- unable to sleep or sleeping all the time   · Unwillingness or inability to communicate  · Depression  · Unusual sadness, discouragement and loneliness  · Talk of wanting to die  · Neglect of personal appearance   · Rebelliousness- reckless behavior  · Withdrawal from people/activities they love  · Confusion- inability to concentrate     If you or a loved one observes any of these behaviors or has concerns about self-harm, here's what you can do:  · Talk about it- your feelings and reasons for  harming yourself  · Remove any means that you might use to hurt yourself (examples: pills, rope, extension cords, firearm)  · Get professional help from the community (Mental Health, Substance Abuse, psychological counseling)  · Do not be alone:Call your Safe Contact- someone whom you trust who will be there for you.  · Call your local CRISIS HOTLINE 501-0086 or 541-255-4578  · Call your local Children's Mobile Crisis Response Team [place] (899) 521-5395 or [employer]  · Call the toll free National Suicide Prevention Hotlines   · National Suicide Prevention Lifeline 220-220-FZNS (5194)  · [employer] Line Network 800-SUICIDE (264-8975)    Coronary Artery Disease, Male  Coronary artery disease (CAD) is a process in which the blood vessels of the heart (coronary arteries) become narrow or blocked. The narrowing or blockage can lead to decreased blood flow to the heart muscle (angina). Prolonged reduced blood flow can cause a heart attack (myocardial infarction, MI). Because CAD is the leading cause of death in men, it is important to understand what causes this condition and how it is treated.  CAUSES  Atherosclerosis is the cause of CAD. This is the buildup of fat and cholesterol (plaque) on the inside of the arteries. Over time, the plaque may narrow or block the artery, and this will lessen blood flow to the heart. Plaque can also become weak and break off within a coronary artery to form a clot and cause a sudden blockage.  RISK FACTORS  Many risk factors increase your chances of getting CAD, including:  · High cholesterol levels.  · High blood pressure (hypertension).  · Tobacco use.  · Diabetes.  · Age. Men over age 45 are at a greater risk of CAD.  · Gender. Men often develop CAD earlier in life than women.  · Family history of CAD.  · Obesity.  · Lack of exercise.  · A diet high in saturated fats.  SYMPTOMS   Many people do not experience any symptoms during the early stages of CAD. As the  condition progresses, symptoms may include:   · Chest pain.  ¨ The pain can be described as a crushing or squeezing in the chest, or a tightness, pressure, fullness, or heaviness in the chest.  ¨ The pain can last more than a few minutes or can stop and recur.   · Pain in the arms, neck, jaw, or back.  · Unexplained heartburn or indigestion.  · Shortness of breath.  · Nausea.  · Sudden cold sweats.   Less common symptoms of CAD in men can include:  · Fatigue.  · Unexplained feelings of nervousness or anxiety.  · Weakness.  · Diarrhea.  · Sudden light-headedness.  DIAGNOSIS   Tests to diagnose CAD may include:  · ECG (electrocardiogram).  · Exercise stress test. This looks for signs of blockage when the heart is being exercised.  · Pharmacologic stress test. This test looks for signs of blockage when the heart is being stressed with a medicine.  · Blood tests.  · Coronary angiogram. This is a procedure to look at the coronary arteries to see if there is any blockage.  TREATMENT  The treatment of CAD may include the following:  · Healthy behavioral changes to reduce or control risk factors.  · Medicine.  · Coronary stenting. A stent helps to keep an artery open.  · Coronary angioplasty. This procedure widens a narrowed or blocked artery.  · Coronary artery bypass surgery. This will allow your blood to pass the blockage (bypass) to reach your heart.  HOME CARE INSTRUCTIONS  · Take medicines only as directed by your health care provider.  · Do not take the following medicines unless your health care provider approves:  ¨ Nonsteroidal anti-inflammatory drugs (NSAIDs), such as ibuprofen, naproxen, or celecoxib.  ¨ Vitamin supplements that contain vitamin A, vitamin E, or both.  · Manage other health conditions such as hypertension and diabetes as directed by your health care provider.  · Follow a heart-healthy diet. A dietitian can help to educate you about healthy food options and changes.  · Use healthy cooking methods  such as roasting, grilling, broiling, baking, poaching, steaming, or stir-frying. Talk to a dietitian to learn more about healthy cooking methods.  · Follow an exercise program approved by your health care provider.  · Maintain a healthy weight. Lose weight as approved by your health care provider.  · Plan rest periods when fatigued.  · Learn to manage stress.  · Do not use any tobacco products, including cigarettes, chewing tobacco, or electronic cigarettes. If you need help quitting, ask your health care provider.  · If you drink alcohol, and your health care provider approves, limit your alcohol intake to no more than 1 drink per day. One drink equals 12 ounces of beer, 5 ounces of wine, or 1½ ounces of hard liquor.  · Stop illegal drug use.  · Your health care provider may ask you to monitor your blood pressure. A blood pressure reading consists of a higher number over a lower number, such as 110 over 72, which is written as 110/72. Ideally, your blood pressure should be:  ¨ Below 140/90 if you have no other medical conditions.  ¨ Below 130/80 if you have diabetes or kidney disease.  · Keep all follow-up visits as directed by your health care provider. This is important.  SEEK IMMEDIATE MEDICAL CARE IF:  · You have pain in your chest, neck, arm, jaw, stomach, or back that lasts more than a few minutes, is recurring, or is unrelieved by taking medicine under your tongue (sublingual nitroglycerin).  · You have profuse sweating without cause.  · You have unexplained:  ¨ Heartburn or indigestion.  ¨ Shortness of breath or difficulty breathing.  ¨ Nausea or vomiting.  ¨ Fatigue.  ¨ Feelings of nervousness or anxiety.  ¨ Weakness.  ¨ Diarrhea.  · You have sudden light-headedness or dizziness.  · You faint.  These symptoms may represent a serious problem that is an emergency. Do not wait to see if the symptoms will go away. Get medical help right away. Call your local emergency services (911 in the [place] Do not drive  yourself to the hospital.     This information is not intended to replace advice given to you by your health care provider. Make sure you discuss any questions you have with your health care provider.     Document Released: 07/15/2015 Document Reviewed: 07/15/2015  [employer] Patient Education ©2016 [employer]      Carvedilol tablets  What is this medicine?  CARVEDILOL (ROSSY ve dil ol) is a beta-blocker. Beta-blockers reduce the workload on the heart and help it to beat more regularly. This medicine is used to treat high blood pressure and heart failure.  This medicine may be used for other purposes; ask your health care provider or pharmacist if you have questions.  COMMON BRAND NAME(S): Coreg  What should I tell my health care provider before I take this medicine?  They need to know if you have any of these conditions:  -circulation problems  -diabetes  -history of heart attack or heart disease  -liver disease  -lung or breathing disease, like asthma or emphysema  -pheochromocytoma  -slow or irregular heartbeat  -thyroid disease  -an unusual or allergic reaction to carvedilol, other beta-blockers, medicines, foods, dyes, or preservatives  -pregnant or trying to get pregnant  -breast-feeding  How should I use this medicine?  Take this medicine by mouth with a glass of water. Follow the directions on the prescription label. It is best to take the tablets with food. Take your doses at regular intervals. Do not take your medicine more often than directed. Do not stop taking except on the advice of your doctor or health care professional.  Talk to your pediatrician regarding the use of this medicine in children. Special care may be needed.  Overdosage: If you think you have taken too much of this medicine contact a poison control center or emergency room at once.  NOTE: This medicine is only for you. Do not share this medicine with others.  What if I miss a dose?  If you miss a dose, take it as soon as you  can. If it is almost time for your next dose, take only that dose. Do not take double or extra doses.  What may interact with this medicine?  Do not take this medicine with any of the following medications:  -sotalol  This medicine may also interact with the following medications:  -cimetidine  -clonidine  -cyclosporine  -digoxin  -MAOIs like Carbex, Eldepryl, Marplan, Nardil, and Parnate  -medicines for blood pressure, heart disease, irregular heart beat  -medicines for depression like fluoxetine and paroxetine  -medicines for diabetes  -medicines to control heart rhythm like propafenone and quinidine  -reserpine  -rifampin  This list may not describe all possible interactions. Give your health care provider a list of all the medicines, herbs, non-prescription drugs, or dietary supplements you use. Also tell them if you smoke, drink alcohol, or use illegal drugs. Some items may interact with your medicine.  What should I watch for while using this medicine?  Check your heart rate and blood pressure regularly while you are taking this medicine. Ask your doctor or health care professional what your heart rate and blood pressure should be, and when you should contact him or her. Do not stop taking this medicine suddenly. This could lead to serious heart-related effects.  Contact your doctor or health care professional if you have difficulty breathing while taking this drug.  Check your weight daily. Ask your doctor or health care professional when you should notify him/her of any weight gain.  You may get drowsy or dizzy. Do not drive, use machinery, or do anything that requires mental alertness until you know how this medicine affects you. To reduce the risk of dizzy or fainting spells, do not sit or stand up quickly. Alcohol can make you more drowsy, and increase flushing and rapid heartbeats. Avoid alcoholic drinks.  If you have diabetes, check your blood sugar as directed. Tell your doctor if you have changes in  your blood sugar while you are taking this medicine.  If you are going to have surgery, tell your doctor or health care professional that you are taking this medicine.  What side effects may I notice from receiving this medicine?  Side effects that you should report to your doctor or health care professional as soon as possible:  -allergic reactions like skin rash, itching or hives, swelling of the face, lips, or tongue  -breathing problems  -dark urine  -irregular heartbeat  -swollen legs or ankles  -vomiting  -yellowing of the eyes or skin  Side effects that usually do not require medical attention (report to your doctor or health care professional if they continue or are bothersome):  -change in sex drive or performance  -diarrhea  -dry eyes (especially if wearing contact lenses)  -dry, itching skin  -headache  -nausea  -unusually tired  This list may not describe all possible side effects. Call your doctor for medical advice about side effects. You may report side effects to FDA at 6-123-FDA-8786.  Where should I keep my medicine?  Keep out of the reach of children.  Store at room temperature below 30 degrees C (86 degrees F). Protect from moisture. Keep container tightly closed. Throw away any unused medicine after the expiration date.  NOTE: This sheet is a summary. It may not cover all possible information. If you have questions about this medicine, talk to your doctor, pharmacist, or health care provider.  © 2014, Elsevier/[employer]. (3/12/2009 2:39:22 PM)

## 2017-06-30 NOTE — DISCHARGE SUMMARY
CHIEF COMPLAINT ON ADMISSION  Chief Complaint   Patient presents with   • Chest Pain     Transfer from [place], chest pain at 0830, resolved after 20 minutes       CODE STATUS  Full Code    HPI & HOSPITAL COURSE  This is a 71 y.o. male here with coronary artery disease with a history of myocardial infarction approximately 2-1/2 years ago where the patient was found with a high-grade LAD lesion that was stented the patient also had a additional lesion of approximately 45% who presents with sudden onset of chest pain while attaching a trailer to his car while on vacation in [place]. He was admitted for ACS with a peak troponin of 3 and Cardiology was consulted for a cardiac catheterization where a stent was placed. He did well and is feeling much better s/p procedure. He is stable for discharge and will continue his dual antiplatelet therapy of Effient and ASA for one year as well as the rest of his medication regimen below. He will follow up with his Cardiologist in [facility] in 7-10 days.     Therefore, he is discharged in good and stable condition with close outpatient follow-up.    SPECIFIC OUTPATIENT FOLLOW-UP  PCP 1-2 weeks  Cardiology 7-10 days     DISCHARGE PROBLEM LIST  Active Problems:    CAD (coronary artery disease) POA: Unknown  Resolved Problems:    NSTEMI (non-ST elevated myocardial infarction) (CMS-[facility]) POA: Unknown      FOLLOW UP  No future appointments.  PCP     Schedule an appointment as soon as possible for a visit in 1 week        MEDICATIONS ON DISCHARGE   Parvez Scott   [facility] Medication Instructions PRISCILA:[id number]    Printed on:06/30/17 1230   Medication Information                      aspirin (ASA) 81 MG Chew Tab chewable tablet  Take 81 mg by mouth every day.             benazepril (LOTENSIN) 20 MG Tab  Take 1 Tab by mouth every day.             carvedilol (COREG) 3.125 MG Tab  Take 1 Tab by mouth 2 times a day, with meals.             glimepiride (AMARYL) 2 MG Tab  Take 2 mg by mouth  every day.             metformin (GLUCOPHAGE) 500 MG Tab  Take 500 mg by mouth 2 times a day, with meals.             prasugrel (EFFIENT) 10 MG Tab  Take 10 mg by mouth every day.             simvastatin (ZOCOR) 40 MG Tab  Take 40 mg by mouth every evening.             trazodone (DESYREL) 50 MG Tab  Take 50 mg by mouth every evening.                 DIET  Orders Placed This Encounter   Procedures   • Diet Order     Standing Status: Standing      Number of Occurrences: 1      Standing Expiration Date:      Order Specific Question:  Diet:     Answer:  Cardiac [6]       ACTIVITY  As tolerated.  Weight bearing as tolerated      CONSULTATIONS  Cardiology     PROCEDURES  Coronary Angiography, Left Heart Catheterization, Left Ventriculography  Percutaneous coronary intervention of the left circumflex artery  Elisa Quesada M.D. 6/29/17    LABORATORY  Lab Results   Component Value Date/Time    SODIUM 138 06/30/2017 03:32 AM    POTASSIUM 4.2 06/30/2017 03:32 AM    CHLORIDE 110 06/30/2017 03:32 AM    CO2 22 06/30/2017 03:32 AM    GLUCOSE 116* 06/30/2017 03:32 AM    BUN 13 06/30/2017 03:32 AM    CREATININE 1.12 06/30/2017 03:32 AM        Lab Results   Component Value Date/Time    WBC 8.5 06/30/2017 03:32 AM    HEMOGLOBIN 13.2* 06/30/2017 03:32 AM    HEMATOCRIT 39.3* 06/30/2017 03:32 AM    PLATELET COUNT 207 06/30/2017 03:32 AM        Total time of the discharge process exceeds 36 minutes

## 2017-06-30 NOTE — CARE PLAN
Problem: Knowledge Deficit  Goal: Knowledge of disease process/condition, treatment plan, diagnostic tests, and medications will improve  Intervention: Explain information regarding disease process/condition, treatment plan, diagnostic tests, and medications and document in education  Pt updated to POC r/t discharge in am. Pt verbalized understanding.       Problem: Discharge Barriers/Planning  Goal: Patient’s continuum of care needs will be met  Intervention: Involve patient and significant other/support system in setting and prioritizing goals for hospital stay and discharge  Pt. Given expectations r/t discharge time. Pt verbalized understanding.

## 2017-06-30 NOTE — PROGRESS NOTES
Monitor Summary     SB- SR 56-70  R PVC; R coup; R trigem  2-3 beats of ideo; 4 beats Idea Pt asymptomatic  .20/.08/.40

## 2017-07-01 ENCOUNTER — PATIENT OUTREACH (OUTPATIENT)
Dept: HEALTH INFORMATION MANAGEMENT | Facility: OTHER | Age: 72
End: 2017-07-01

## 2017-11-28 NOTE — ADDENDUM NOTE
Encounter addended by: Kamila Wong R.N. on: 11/28/2017 11:38 AM<BR>    Actions taken: Flowsheet accepted
